# Patient Record
Sex: MALE | Race: BLACK OR AFRICAN AMERICAN | Employment: FULL TIME | URBAN - METROPOLITAN AREA
[De-identification: names, ages, dates, MRNs, and addresses within clinical notes are randomized per-mention and may not be internally consistent; named-entity substitution may affect disease eponyms.]

---

## 2020-11-03 ENCOUNTER — HOSPITAL ENCOUNTER (EMERGENCY)
Age: 45
Discharge: HOME | End: 2020-11-03
Attending: EMERGENCY MEDICINE

## 2020-11-03 VITALS
WEIGHT: 166 LBS | RESPIRATION RATE: 16 BRPM | SYSTOLIC BLOOD PRESSURE: 143 MMHG | TEMPERATURE: 98.8 F | OXYGEN SATURATION: 100 % | DIASTOLIC BLOOD PRESSURE: 92 MMHG | HEIGHT: 70 IN | HEART RATE: 67 BPM | BODY MASS INDEX: 23.77 KG/M2

## 2020-11-03 PROCEDURE — 99282 EMERGENCY DEPT VISIT SF MDM: CPT

## 2020-11-03 RX ORDER — NAPROXEN 500 MG/1
500 TABLET ORAL 2 TIMES DAILY WITH MEALS
Qty: 28 TABLET | Refills: 0 | Status: SHIPPED | OUTPATIENT
Start: 2020-11-03

## 2020-11-03 ASSESSMENT — PAIN SCALES - GENERAL: PAINLEVEL_OUTOF10: 7

## 2020-11-03 ASSESSMENT — PAIN DESCRIPTION - PAIN TYPE: TYPE: ACUTE PAIN

## 2020-11-03 ASSESSMENT — PAIN DESCRIPTION - LOCATION: LOCATION: BACK;WRIST

## 2020-11-03 ASSESSMENT — PAIN DESCRIPTION - DESCRIPTORS: DESCRIPTORS: ACHING

## 2020-11-03 ASSESSMENT — PAIN DESCRIPTION - FREQUENCY: FREQUENCY: CONTINUOUS

## 2020-11-03 ASSESSMENT — PAIN DESCRIPTION - ORIENTATION: ORIENTATION: RIGHT;LEFT

## 2020-11-03 ASSESSMENT — PAIN DESCRIPTION - ONSET: ONSET: AWAKENED FROM SLEEP

## 2020-11-03 NOTE — ED PROVIDER NOTES
drugs.    Medications     Discharge Medication List as of 11/3/2020  8:59 AM          Allergies     He has No Known Allergies. Physical Exam     INITIAL VITALS: BP: (!) 143/92, Temp: 98.8 °F (37.1 °C), Pulse: 67, Resp: 16, SpO2: 100 %     General: Well appearing. Pleasantly conversational, and in NAD. HEENT: Pupils are equal, round, and reactive to light. Extraocular muscles are intact. Conjunctivae are clear and moist. No redness or drainage from the eyes. Mask is in place. Neck: Supple, with full range of motion. Cardiovascular:  2+ radial pulses bilaterally. Respiratory: Unlabored breathing with equal chest rise and fall. Skin: Warm and dry, without rashes or ecchymoses, lacerations or abrasions. Neuro: Alert and oriented x3. No focal neurologic deficits are noted. Extremities: Warm and well-perfused, without clubbing, cyanosis, or edema. The patient moves all extremities equally. On focused examination of the hands and wrists, the patient has very symmetric exam bilaterally. In neither hand or wrist is there any edema or deformity. In both wrists he has no focal bony tenderness to palpation, but has some tenderness along the volar ulnar aspect of the wrist in the region of flexor carpi ulnaris, with some pain on supination and pronation in the wrists, and pain on active and passive extension of the wrists. There is no associated warmth or erythema superficially. Patient endorses some occasional paresthetic sensation primarily along the palmar surface of the fifth digits bilaterally, with normal sensation on the dorsal surface. Psych: The patient's mood and affect are generally within normal limits for their presentation. Diagnostic Results       RADIOLOGY:  No orders to display       LABS:   No results found for this visit on 11/03/20.       RECENT VITALS:  BP: (!) 143/92, Temp: 98.8 °F (37.1 °C), Pulse: 67, Resp: 16, SpO2: 100 %     Procedures       ED Course Nursing Notes, Past Medical Hx, Past Surgical Hx, Social Hx, Allergies, and Family Hx were reviewed. The patient was given the following medications:  Orders Placed This Encounter   Medications    naproxen (NAPROSYN) 500 MG tablet     Sig: Take 1 tablet by mouth 2 times daily (with meals)     Dispense:  28 tablet     Refill:  0       CONSULTS:  None    MEDICAL DECISION MAKING / ASSESSMENT / Stephanie Arnie is a 41 y.o. male who presents with persistent volar ulnar wrist discomfort, particularly with twisting motions of the wrist and with extension at the wrists, symmetric bilaterally, for 2 weeks after an injury in which he feels that his wrists were strained. There is no bony tenderness to palpation or deformity. There is no indication at this time for plain films. He seems to have strain of the soft tissues on the volar ulnar aspect of the wrist.  He describes a symmetric paresthetic sensation in the palmar surface of the fifth digits bilaterally, and there may be some component of ulnar neuropathy at the wrist.  Patient was placed in Velcro wrist splints for comfort, advised on scheduled NSAID use, and referred to hand surgery for further evaluation and management. Clinical Impression     1. Sprain of left wrist, initial encounter    2. Sprain of right wrist, initial encounter        Disposition     PATIENT REFERRED TO:  Jarek Mendoza MD  [facility] [address]  [phone]    Call today  to discuss your ER visit, and arrange a follow-up appointment      DISCHARGE MEDICATIONS:  Discharge Medication List as of 11/3/2020  8:59 AM      START taking these medications    Details   naproxen (NAPROSYN) 500 MG tablet Take 1 tablet by mouth 2 times daily (with meals), Disp-28 tablet,R-0Print             DISPOSITION Decision To Discharge    (Please note that portions of this note were completed with voice recognition software.   Efforts were made to edit the dictations but occasionally words are mis-transcribed.)     Marci Coy MD  11/03/20 7067

## 2020-11-03 NOTE — ED NOTES
Bilateral velcro wrist splints applied. Instructions provided and patient voiced understanding.      Jona Segovia RN  11/03/20 9723

## 2020-11-16 ENCOUNTER — OFFICE VISIT (OUTPATIENT)
Dept: ORTHOPEDIC SURGERY | Age: 45
End: 2020-11-16

## 2020-11-16 VITALS — HEIGHT: 70 IN | BODY MASS INDEX: 23.77 KG/M2 | WEIGHT: 166 LBS

## 2020-11-16 PROCEDURE — 99203 OFFICE O/P NEW LOW 30 MIN: CPT | Performed by: ORTHOPAEDIC SURGERY

## 2020-11-16 PROCEDURE — L3908 WHO COCK-UP NONMOLDE PRE OTS: HCPCS | Performed by: ORTHOPAEDIC SURGERY

## 2020-11-16 NOTE — PROGRESS NOTES
Chief Complaint    Wrist Pain (Bilateral)      History of Present Illness:  Eliza Gutierrez is a 41 y.o. male, right-hand-dominant, works at [employer], presenting with history of bilateral wrist injury  Date of injury: 10/16/2020  This is a work-related injury that occurred when the patient was stuck between 2 falling zelaya causing his wrist to be bent awkwardly  He presented to the emergency department after his injury and was diagnosed with bilateral wrist sprains. Since that time he has continued to have pain in both of his wrists as well as both of his hands intermittently  He has pain with certain hand and wrist movements  The patient does report history of left wrist injury also well working with some heavy folding zelaya approximately 5 and half months ago to his left wrist and he feels that this most recent injury may have exacerbated some of the symptoms  Treatment to date has included wrist braces but he feels that these have not been particularly effective         Medical History:  Patient's medications, allergies, past medical, surgical, social and family histories were reviewed and updated as appropriate. Past Medical History:   Diagnosis Date    Lung injury     occurred with surgery       Review of Systems:  Pertinent items are noted in HPI  Review of systems reviewed from Patient History Form dated on 11/16/2020  and available in the patient's chart under the Media tab. Vital Signs: There were no vitals taken for this visit. General Exam:   Constitutional: Patient is adequately groomed with no evidence of malnutrition  DTRs: Deep tendon reflexes are intact  Mental Status: The patient is oriented to time, place and person. The patient's mood and affect are appropriate. Lymphatic: The lymphatic examination bilaterally reveals all areas to be without enlargement or induration. Vascular: Examination reveals no swelling or calf tenderness.   Peripheral pulses are palpable tendinitis  Follow-up after MRI is completed to discuss results and further treatment options

## 2020-12-08 ENCOUNTER — TELEPHONE (OUTPATIENT)
Dept: ORTHOPEDIC SURGERY | Age: 45
End: 2020-12-08

## 2020-12-08 NOTE — TELEPHONE ENCOUNTER
General Question     Subject: Referral for back  Patient and /or Facility Request: Jenna Alva  Contact Number: [phone]  He would like for Dr. Rosio Hardin to give him a call.

## 2020-12-15 ENCOUNTER — TELEPHONE (OUTPATIENT)
Dept: ORTHOPEDIC SURGERY | Age: 45
End: 2020-12-15

## 2020-12-21 ENCOUNTER — OFFICE VISIT (OUTPATIENT)
Dept: ORTHOPEDIC SURGERY | Age: 45
End: 2020-12-21
Payer: COMMERCIAL

## 2020-12-21 VITALS — BODY MASS INDEX: 23.77 KG/M2 | WEIGHT: 166 LBS | HEIGHT: 70 IN

## 2020-12-21 PROCEDURE — 20605 DRAIN/INJ JOINT/BURSA W/O US: CPT | Performed by: ORTHOPAEDIC SURGERY

## 2020-12-21 PROCEDURE — 99213 OFFICE O/P EST LOW 20 MIN: CPT | Performed by: ORTHOPAEDIC SURGERY

## 2020-12-21 RX ORDER — TRIAMCINOLONE ACETONIDE 40 MG/ML
40 INJECTION, SUSPENSION INTRA-ARTICULAR; INTRAMUSCULAR ONCE
Status: COMPLETED | OUTPATIENT
Start: 2020-12-21 | End: 2020-12-21

## 2020-12-21 RX ADMIN — TRIAMCINOLONE ACETONIDE 40 MG: 40 INJECTION, SUSPENSION INTRA-ARTICULAR; INTRAMUSCULAR at 11:11

## 2020-12-21 NOTE — PROGRESS NOTES
Injection administration details:  Date & Time: 12/21/20 11:12 AM  Site & Comments: LEFT WRIST  administered by Dr Jeff Cough 1%   CC# : 1  NDC: 2811-3820-63  Lot number: [id number]  EXP: 5/21

## 2020-12-21 NOTE — PROGRESS NOTES
Assessment: 67-year-old male presenting with bilateral wrist pain mostly ulnar-sided left greater than right  1. Suspect component of left wrist TFCC pathology possible tear versus sprain, is a triquetral arthritis left wrist  Right wrist possible TFCC pathology versus lunotriquetral or visit triquetral arthritis/ligamentous pathology    Treatment Plan: I had a discussion with the patient today specifically reviewing the results of MRI of his left wrist.  His MRI does demonstrate some inflammation around his TFCC and also some changes at the pisotriquetral joint. On his examination he does have more tenderness at the ulnar fovea and TFCC and still some tenderness along the FCU tendon but not as much specific tenderness of his recreational joint. I did suggest with him several options today including corticosteroid injection ulnocarpal for a one-time injection to try to calm some the inflammation down as well as surgical intervention particularly if persistent symptoms for further investigation including diagnostic/treatment with wrist arthroscopy  For now we will continue to brace his right wrist and monitor symptoms and performed a left wrist injection today  The risks and benefits of steroid injection were discussed thoroughly. Risks discussed included infection, adverse drug reaction, increased pain post-injection, skin de-pigmentation, fatty atrophy, and persistent clinical symptoms despite injection. The injection was given after cleansing the skin with alcohol. The patients left wrist ulnocarpal joint was prepped for steroid injection. Using sterile technique, the left wrist ulnocarpal joint was injected with a mixture of one ml of 40mg/ml Kenalog and 1 mL 1% lidocaine. Appropriate post injection instructions were given. The patient tolerated the injection well and a Band-aid was placed.    Follow-up plans discussed with patient including plan for follow-up in approximately 1 month for repeat greater than right  Mild tenderness along FCU tendon bilateral wrist with no palpable crepitus  Mild tenderness at left triquetrum and lunotriquetral interval with no tenderness throughout the remainder of bilateral wrists and hands     Range of Motion: Comfortable range of motion with full composite fist and full active extension of all fingers  Approximately 70 degrees of wrist flexion and extension with 70 degrees of pronation supination mild discomfort with terminal supination  There is mild discomfort with terminal extension bilateral wrist  Strength: 5 out of 5 EPL FPL thumb abduction  5-5 FDS FDP EDC interossei all fingers     Special Tests: Negative Casillas scaphoid shift test bilaterally  5 out of 5 wrist flexion and extension  Stable distal radial ulnar joint pronation supination neutral position  Negative Tinel's bilateral carpal tunnel and negative direct carpal tunnel compression test     Skin: There are no rashes, ulcerations or lesions.     Gait: Nonantalgic heel-to-toe gait    Capillary refill brisk all fingers, symmetric  Gross sensation intact to light touch median/ulnar/radial nerves  Sensation intact to radial/ulnar aspect of fingertip        Radiology:    X-rays obtained and reviewed in office:  No new x-rays obtained today    Additional Diagnostic Test Findings:    Office Procedures:  No orders of the defined types were placed in this encounter. Leah Caal MD  Orthopaedic Surgeon, Hand & Upper Extremity   [facility]    Contact Information:  Shravanalecia Kathrin: [phone] Clinical )    This dictation was performed with a verbal recognition program [facility]) and it was checked for errors. It is possible that there are still dictated errors within this office note. If so, please bring any errors to my attention for an addendum. All efforts were made to ensure that this office note is accurate.

## 2020-12-22 ENCOUNTER — TELEPHONE (OUTPATIENT)
Dept: ORTHOPEDIC SURGERY | Age: 45
End: 2020-12-22

## 2020-12-22 NOTE — TELEPHONE ENCOUNTER
General Question     Subject: PROCEDURE CODE   Patient and /or Facility Request: Angela Jeffrey  Contact Number: [phone]

## 2022-11-16 ENCOUNTER — OFFICE VISIT (OUTPATIENT)
Dept: FAMILY MEDICINE CLINIC | Age: 47
End: 2022-11-16
Payer: COMMERCIAL

## 2022-11-16 VITALS
OXYGEN SATURATION: 98 % | HEART RATE: 88 BPM | WEIGHT: 203.6 LBS | SYSTOLIC BLOOD PRESSURE: 138 MMHG | RESPIRATION RATE: 16 BRPM | TEMPERATURE: 97.5 F | BODY MASS INDEX: 28.5 KG/M2 | HEIGHT: 71 IN | DIASTOLIC BLOOD PRESSURE: 90 MMHG

## 2022-11-16 DIAGNOSIS — R03.0 ELEVATED BLOOD PRESSURE READING IN OFFICE WITHOUT DIAGNOSIS OF HYPERTENSION: ICD-10-CM

## 2022-11-16 DIAGNOSIS — R45.89 DEPRESSED MOOD: ICD-10-CM

## 2022-11-16 DIAGNOSIS — R00.2 HEART PALPITATIONS: Primary | ICD-10-CM

## 2022-11-16 PROCEDURE — 99204 OFFICE O/P NEW MOD 45 MIN: CPT | Performed by: FAMILY MEDICINE

## 2022-11-16 RX ORDER — BUPROPION HYDROCHLORIDE 150 MG/1
150 TABLET ORAL EVERY MORNING
Qty: 30 TABLET | Refills: 3 | Status: SHIPPED | OUTPATIENT
Start: 2022-11-16

## 2022-11-16 RX ORDER — BLOOD PRESSURE TEST KIT
1 KIT MISCELLANEOUS 2 TIMES DAILY
Qty: 1 KIT | Refills: 0 | Status: SHIPPED | OUTPATIENT
Start: 2022-11-16

## 2022-11-16 SDOH — ECONOMIC STABILITY: FOOD INSECURITY: WITHIN THE PAST 12 MONTHS, THE FOOD YOU BOUGHT JUST DIDN'T LAST AND YOU DIDN'T HAVE MONEY TO GET MORE.: OFTEN TRUE

## 2022-11-16 SDOH — ECONOMIC STABILITY: FOOD INSECURITY: WITHIN THE PAST 12 MONTHS, YOU WORRIED THAT YOUR FOOD WOULD RUN OUT BEFORE YOU GOT MONEY TO BUY MORE.: OFTEN TRUE

## 2022-11-16 ASSESSMENT — PATIENT HEALTH QUESTIONNAIRE - PHQ9
3. TROUBLE FALLING OR STAYING ASLEEP: 3
9. THOUGHTS THAT YOU WOULD BE BETTER OFF DEAD, OR OF HURTING YOURSELF: 0
SUM OF ALL RESPONSES TO PHQ QUESTIONS 1-9: 13
SUM OF ALL RESPONSES TO PHQ9 QUESTIONS 1 & 2: 6
7. TROUBLE CONCENTRATING ON THINGS, SUCH AS READING THE NEWSPAPER OR WATCHING TELEVISION: 1
5. POOR APPETITE OR OVEREATING: 0
2. FEELING DOWN, DEPRESSED OR HOPELESS: 3
SUM OF ALL RESPONSES TO PHQ QUESTIONS 1-9: 13
4. FEELING TIRED OR HAVING LITTLE ENERGY: 0
SUM OF ALL RESPONSES TO PHQ QUESTIONS 1-9: 13
SUM OF ALL RESPONSES TO PHQ QUESTIONS 1-9: 13
10. IF YOU CHECKED OFF ANY PROBLEMS, HOW DIFFICULT HAVE THESE PROBLEMS MADE IT FOR YOU TO DO YOUR WORK, TAKE CARE OF THINGS AT HOME, OR GET ALONG WITH OTHER PEOPLE: 1
6. FEELING BAD ABOUT YOURSELF - OR THAT YOU ARE A FAILURE OR HAVE LET YOURSELF OR YOUR FAMILY DOWN: 3
1. LITTLE INTEREST OR PLEASURE IN DOING THINGS: 3
8. MOVING OR SPEAKING SO SLOWLY THAT OTHER PEOPLE COULD HAVE NOTICED. OR THE OPPOSITE, BEING SO FIGETY OR RESTLESS THAT YOU HAVE BEEN MOVING AROUND A LOT MORE THAN USUAL: 0

## 2022-11-16 ASSESSMENT — SOCIAL DETERMINANTS OF HEALTH (SDOH): HOW HARD IS IT FOR YOU TO PAY FOR THE VERY BASICS LIKE FOOD, HOUSING, MEDICAL CARE, AND HEATING?: VERY HARD
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
## 2022-11-17 NOTE — PROGRESS NOTES
[address]   Cardiac Consultation    Referring Provider:  Kamran West DO     Chief Complaint   Patient presents with    New Patient        History of Present Illness:   Lu Whatley. Ute Leija, 56 y.o. male presenting today as a new patient consult from Kamran West DO for further cardiac evaluation of atrial fibrillation. He feels heart fluttering about 1-3 times a year. Nov of last year is that last time he felt it due to being on a medication for back pain. He states that it went on for a couple of days. Stopped the med and it resolved. No assoc chest pain at that time. Symptoms were moderate severity. He states that he has not had any chest pain sine 2018 associated w/ his AF. 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 No functional limitations. He stopped smoking in Aug 2020. He denies active chest pain, sob, dizziness, syncope and edema. Past Medical History:   has a past medical history of Atrial fibrillation ([address] Chronic back pain, Depression, Hyperthyroidism, Lung injury, and Osteoarthritis. Surgical History:   has a past surgical history that includes Exploration of undescended testicle. Social History:   reports that he quit smoking about 2 years ago. His smoking use included cigarettes. He has never used smokeless tobacco. He reports current alcohol use. He reports that he does not use drugs. Family History:  family history includes Cancer in his paternal aunt. Home Medications:  Prior to Admission medications    Medication Sig Start Date End Date Taking? Authorizing Provider   Blood Pressure KIT 1 kit by Does not apply route 2 times daily 11/16/22  Yes Kamran West DO   buPROPion (WELLBUTRIN XL) 150 MG extended release tablet Take 1 tablet by mouth every morning 11/16/22  Yes Kamran West DO        Allergies:  Patient has no known allergies.      Review of Systems:   Constitutional: there has been no unanticipated weight loss. There's been no change in energy level, sleep pattern, or activity level. Eyes: No visual changes or diplopia. No scleral icterus. ENT: No Headaches, hearing loss or vertigo. No mouth sores or sore throat. Cardiovascular: Reviewed in HPI  Respiratory: No cough or wheezing, no sputum production. No hematemesis. Gastrointestinal: No abdominal pain, appetite loss, blood in stools. No change in bowel or bladder habits. Genitourinary: No dysuria, trouble voiding, or hematuria. Musculoskeletal:  No gait disturbance, weakness or joint complaints. Integumentary: No rash or pruritis. Neurological: No headache, diplopia, change in muscle strength, numbness or tingling. No change in gait, balance, coordination, mood, affect, memory, mentation, behavior. Psychiatric: No anxiety, no depression. Endocrine: No malaise, fatigue or temperature intolerance. No excessive thirst, fluid intake, or urination. No tremor. Hematologic/Lymphatic: No abnormal bruising or bleeding, blood clots or swollen lymph nodes. Allergic/Immunologic: No nasal congestion or hives. Physical Examination:    Vitals:    11/18/22 1222   BP: 130/72   Pulse: 73   SpO2: 99%        Constitutional and General Appearance: NAD   Respiratory:  Normal excursion and expansion without use of accessory muscles  Resp Auscultation: Normal breath sounds without dullness  Cardiovascular: The apical impulses not displaced  Heart tones are crisp and normal  Cervical veins are not engorged  The carotid upstroke is normal in amplitude and contour without delay or bruit  Normal S1S2, No S3, No Murmur  Peripheral pulses are symmetrical and full  There is no clubbing, cyanosis of the extremities.   No edema  Femoral Arteries: 2+ and equal  Pedal Pulses: 2+ and equal   Abdomen:  No masses or tenderness  Liver/Spleen: No Abnormalities Noted  Neurological/Psychiatric:  Alert and oriented in all spheres  Moves all extremities well  Exhibits normal gait balance and coordination  No abnormalities of mood, affect, memory, mentation, or behavior are noted      EK17 [facility]  Abnormal, Atrial Fibrillation    [facility]  Sinus [facility], HR 88    ECHO: 17 [facility]  Summary:   The left ventricular wall motion is normal.   Overall left ventricular ejection fraction is estimated to be 60-65%. There is trace mitral regurgitation. There is borderline concentric left ventricular hypertrophy. Assessment:   PAF - only known documented episode  - spontaneous resolution. Palpitations - rare, self-limited     Plan:  Cardiac event monitor for 30 days. If no AF, would not add any meds. Instructed patient that if has symptoms in future, should call or go to ER. Possible cardiac medications reviewed including indications and pertinent side effects. Will defer at this time. Check blood pressure and heart rate at home a few times per week- keep a log with dates and times and bring to office visit   Regular exercise and following a healthy diet encouraged   Follow up pending monitor results or if recurrent symptoms    The scribes documentation has been prepared under my direction and personally reviewed by me in its entirety. I confirm that the note above accurately reflects all work, treatment, procedures, and medical decision making performed by me. Dr. Chencho Orozco MD     Scribe's attestation: This note was scribed in the presence of Dr. Chencho Orozco MD   by Gerardo Mulligan LPN      Thank you for allowing me to participate in the care of this individual.      Stefan Jenkins.  Phyllis Dumont M.D., Shavonne Montiel

## 2022-11-18 ENCOUNTER — OFFICE VISIT (OUTPATIENT)
Dept: CARDIOLOGY CLINIC | Age: 47
End: 2022-11-18
Payer: COMMERCIAL

## 2022-11-18 VITALS
HEART RATE: 73 BPM | SYSTOLIC BLOOD PRESSURE: 130 MMHG | DIASTOLIC BLOOD PRESSURE: 72 MMHG | OXYGEN SATURATION: 99 % | WEIGHT: 203.4 LBS | HEIGHT: 71 IN | BODY MASS INDEX: 28.48 KG/M2

## 2022-11-18 DIAGNOSIS — I49.9 IRREGULAR HEART RATE: Primary | ICD-10-CM

## 2022-11-18 PROCEDURE — 99214 OFFICE O/P EST MOD 30 MIN: CPT | Performed by: INTERNAL MEDICINE

## 2022-11-18 PROCEDURE — 93000 ELECTROCARDIOGRAM COMPLETE: CPT | Performed by: INTERNAL MEDICINE

## 2022-11-18 NOTE — LETTER
The [address] [place] [address], [address] [place] [address]  P:[phone]  F: [phone]      November 18, 2022      G[address] Mare García, 1975 was seen in our office today for an appointment. If you have any questions, please do not hesitate to call our office.     Thank you,      Dr. Xavier Johnson MD

## 2022-11-22 ENCOUNTER — TELEPHONE (OUTPATIENT)
Dept: CARDIOLOGY CLINIC | Age: 47
End: 2022-11-22

## 2022-11-22 NOTE — TELEPHONE ENCOUNTER
Monitor placed by [facility]  Monitor company [employer]  Length of monitor 30 DAY  Monitor ordered by [facility]  Serial number [id number]  Patch ID [id number]  Activation successful prior to pt leaving office?  Yes

## 2022-12-13 ENCOUNTER — OFFICE VISIT (OUTPATIENT)
Dept: FAMILY MEDICINE CLINIC | Age: 47
End: 2022-12-13
Payer: COMMERCIAL

## 2022-12-13 VITALS
WEIGHT: 200.2 LBS | TEMPERATURE: 97.7 F | HEART RATE: 88 BPM | RESPIRATION RATE: 16 BRPM | BODY MASS INDEX: 27.92 KG/M2 | SYSTOLIC BLOOD PRESSURE: 147 MMHG | OXYGEN SATURATION: 98 % | DIASTOLIC BLOOD PRESSURE: 97 MMHG

## 2022-12-13 DIAGNOSIS — H53.9 VISION DISTURBANCE: ICD-10-CM

## 2022-12-13 DIAGNOSIS — I10 PRIMARY HYPERTENSION: ICD-10-CM

## 2022-12-13 DIAGNOSIS — R45.89 DEPRESSED MOOD: Primary | ICD-10-CM

## 2022-12-13 PROCEDURE — 3075F SYST BP GE 130 - 139MM HG: CPT | Performed by: FAMILY MEDICINE

## 2022-12-13 PROCEDURE — 3078F DIAST BP <80 MM HG: CPT | Performed by: FAMILY MEDICINE

## 2022-12-13 PROCEDURE — 99214 OFFICE O/P EST MOD 30 MIN: CPT | Performed by: FAMILY MEDICINE

## 2022-12-13 RX ORDER — AMLODIPINE BESYLATE 10 MG/1
10 TABLET ORAL DAILY
Qty: 30 TABLET | Refills: 5 | Status: SHIPPED | OUTPATIENT
Start: 2022-12-13

## 2022-12-13 RX ORDER — BUPROPION HYDROCHLORIDE 300 MG/1
300 TABLET ORAL EVERY MORNING
Qty: 30 TABLET | Refills: 3 | Status: SHIPPED | OUTPATIENT
Start: 2022-12-13

## 2022-12-13 ASSESSMENT — PATIENT HEALTH QUESTIONNAIRE - PHQ9
2. FEELING DOWN, DEPRESSED OR HOPELESS: 0
SUM OF ALL RESPONSES TO PHQ QUESTIONS 1-9: 0
SUM OF ALL RESPONSES TO PHQ QUESTIONS 1-9: 0
1. LITTLE INTEREST OR PLEASURE IN DOING THINGS: 0
SUM OF ALL RESPONSES TO PHQ QUESTIONS 1-9: 0
SUM OF ALL RESPONSES TO PHQ QUESTIONS 1-9: 0
SUM OF ALL RESPONSES TO PHQ9 QUESTIONS 1 & 2: 0

## 2022-12-13 NOTE — PROGRESS NOTES
2022    This is a 56 y.o. male   Chief Complaint   Patient presents with    Follow-up     Depression    . HPI  Pt presents today for  a depression and BP follow-up. Depressed mood: Currently taking Wellbutrin  mg, states he is not as down as before. Elevated blood pressure: BP elevated today at 149/97  Home readings ranging upper upper 140's-200/90's -110's  Admits to HA's, doesn't add salt, doesn't eat often in restaurants    Also admits to spots in his eyes, right eye is blurry and sees flashing.     Past Medical History:   Diagnosis Date    Atrial fibrillation (HCC)     Chronic back pain     Depression     Hyperthyroidism     Lung injury     occurred with surgery    Osteoarthritis        Past Surgical History:   Procedure Laterality Date    EXPLORATION OF UNDESCENDED TESTICLE         Social History     Socioeconomic History    Marital status: Single     Spouse name: Not on file    Number of children: Not on file    Years of education: Not on file    Highest education level: Not on file   Occupational History    Not on file   Tobacco Use    Smoking status: Former     Types: Cigarettes     Quit date: 2020     Years since quittin.3    Smokeless tobacco: Never   Vaping Use    Vaping Use: Never used   Substance and Sexual Activity    Alcohol use: Yes     Comment: 3 days a week    Drug use: Never    Sexual activity: Yes     Partners: Female   Other Topics Concern    Not on file   Social History Narrative    Not on file     Social Determinants of Health     Financial Resource Strain: High Risk    Difficulty of Paying Living Expenses: Very hard   Food Insecurity: Food Insecurity Present    Worried About Running Out of Food in the Last Year: Often true    Ran Out of Food in the Last Year: Often true   Transportation Needs: Not on file   Physical Activity: Not on file   Stress: Not on file   Social Connections: Not on file   Intimate Partner Violence: Not on file   Housing Stability: Not on file       Family History   Problem Relation Age of Onset    Cancer Paternal Aunt        Current Outpatient Medications   Medication Sig Dispense Refill    amLODIPine (NORVASC) 10 MG tablet Take 1 tablet by mouth daily . Take 1/2 tablet daily for the first week. 30 tablet 5    buPROPion (WELLBUTRIN XL) 300 MG extended release tablet Take 1 tablet by mouth every morning 30 tablet 3    Blood Pressure KIT 1 kit by Does not apply route 2 times daily 1 kit 0     No current facility-administered medications for this visit. Immunization History   Administered Date(s) Administered    COVID-19, PFIZER PURPLE top, DILUTE for use, (age 15 y+), 30mcg/0.3mL 04/07/2021, 04/30/2021       No Known Allergies    No results found for any previous visit. Review of Systems   Eyes:  Positive for visual disturbance. Neurological:  Positive for headaches. BP (!) 147/97 (Site: Right Upper Arm, Position: Sitting, Cuff Size: Large Adult)   Pulse 88   Temp 97.7 °F (36.5 °C) (Temporal)   Resp 16   Wt 200 lb 3.2 oz (90.8 kg)   SpO2 98%   BMI 27.92 kg/m²     Physical Exam  Vitals reviewed. Plan   Diagnosis Orders   1. Depressed mood  Improved but still present, increase Wellbutrin XL to 300 mg daily      2. Primary hypertension  amLODIPine (NORVASC) 10 MG tablet (5 mg for the first week)      3. Vision disturbance  Kyler Amador MD, Ophthalmology, [facility]          BP BID x 1 week and call the office with the readings. Return in about 1 month (around 1/13/2023) for Physical Exam.    Prior to Visit Medications    Medication Sig Taking? Authorizing Provider   amLODIPine (NORVASC) 10 MG tablet Take 1 tablet by mouth daily . Take 1/2 tablet daily for the first week.  Yes Nai Curtis DO   buPROPion (WELLBUTRIN XL) 300 MG extended release tablet Take 1 tablet by mouth every morning Yes Nai Curtis DO   Blood Pressure KIT 1 kit by Does not apply route 2 times daily  Nai Curtis DO

## 2022-12-14 ENCOUNTER — TELEPHONE (OUTPATIENT)
Dept: FAMILY MEDICINE CLINIC | Age: 47
End: 2022-12-14

## 2022-12-14 NOTE — TELEPHONE ENCOUNTER
Patient called to report BP readings as instructed by provider   Today 12/14/22 -172/94 @4:45am                              157/92@ 2:13pm

## 2022-12-17 NOTE — TELEPHONE ENCOUNTER
Please thank pt for checking his BP and letting us know the readings. I'd for him to write down his AM and PM readings and call the office on 12/21 with the readings. This will give the Amlodipine a few more days to reach a therapeutic level. Thank you.

## 2022-12-22 ENCOUNTER — TELEPHONE (OUTPATIENT)
Dept: FAMILY MEDICINE CLINIC | Age: 47
End: 2022-12-22

## 2022-12-22 DIAGNOSIS — M54.50 LOW BACK PAIN, UNSPECIFIED BACK PAIN LATERALITY, UNSPECIFIED CHRONICITY, UNSPECIFIED WHETHER SCIATICA PRESENT: Primary | ICD-10-CM

## 2022-12-22 NOTE — TELEPHONE ENCOUNTER
Patient is asking for a referral/order to get an epidural shot for his back. He states that Dr. Anuradha Perez (Alonso Mcmahan?) with [facility] told him to speak to his PCP regarding this.      12/13/2022 last ov    Future Appointments   Date Time Provider Enoc Alvarez   2/22/2023  3:30 PM Geovanna Raphael DO [place] [id number]

## 2022-12-29 PROCEDURE — 93228 REMOTE 30 DAY ECG REV/REPORT: CPT | Performed by: INTERNAL MEDICINE

## 2023-01-08 NOTE — TELEPHONE ENCOUNTER
I cannot find a Dr. Hernandez Body. Can you please recheck the name/spelling so that I can place the referral? Thank you.

## 2023-01-10 NOTE — TELEPHONE ENCOUNTER
Patient called back and didn't know the spelling of that Dr. Nika Lindsey (he didn't even know if that was his name), but said that Dr he mentioned was in the same practice as Dr. Rae Johnson. He said they received the referral that we already sent and he has an appt on the 01/16/23.

## 2023-01-17 ENCOUNTER — OFFICE VISIT (OUTPATIENT)
Dept: FAMILY MEDICINE CLINIC | Age: 48
End: 2023-01-17
Payer: COMMERCIAL

## 2023-01-17 VITALS
HEIGHT: 71 IN | OXYGEN SATURATION: 97 % | HEART RATE: 102 BPM | TEMPERATURE: 98.8 F | DIASTOLIC BLOOD PRESSURE: 97 MMHG | WEIGHT: 202 LBS | SYSTOLIC BLOOD PRESSURE: 144 MMHG | BODY MASS INDEX: 28.28 KG/M2

## 2023-01-17 DIAGNOSIS — I10 PRIMARY HYPERTENSION: ICD-10-CM

## 2023-01-17 DIAGNOSIS — H93.13 TINNITUS, BILATERAL: ICD-10-CM

## 2023-01-17 DIAGNOSIS — J01.00 ACUTE MAXILLARY SINUSITIS, RECURRENCE NOT SPECIFIED: Primary | ICD-10-CM

## 2023-01-17 DIAGNOSIS — R05.9 COUGH, UNSPECIFIED TYPE: ICD-10-CM

## 2023-01-17 LAB
INFLUENZA A ANTIBODY: NEGATIVE
INFLUENZA B ANTIBODY: NEGATIVE

## 2023-01-17 PROCEDURE — 87804 INFLUENZA ASSAY W/OPTIC: CPT | Performed by: STUDENT IN AN ORGANIZED HEALTH CARE EDUCATION/TRAINING PROGRAM

## 2023-01-17 PROCEDURE — 3078F DIAST BP <80 MM HG: CPT | Performed by: STUDENT IN AN ORGANIZED HEALTH CARE EDUCATION/TRAINING PROGRAM

## 2023-01-17 PROCEDURE — 3074F SYST BP LT 130 MM HG: CPT | Performed by: STUDENT IN AN ORGANIZED HEALTH CARE EDUCATION/TRAINING PROGRAM

## 2023-01-17 PROCEDURE — 99214 OFFICE O/P EST MOD 30 MIN: CPT | Performed by: STUDENT IN AN ORGANIZED HEALTH CARE EDUCATION/TRAINING PROGRAM

## 2023-01-17 RX ORDER — AZITHROMYCIN 250 MG/1
250 TABLET, FILM COATED ORAL SEE ADMIN INSTRUCTIONS
Qty: 6 TABLET | Refills: 0 | Status: SHIPPED | OUTPATIENT
Start: 2023-01-17 | End: 2023-01-22

## 2023-01-17 RX ORDER — FLUTICASONE PROPIONATE 50 MCG
1 SPRAY, SUSPENSION (ML) NASAL DAILY
Qty: 16 G | Refills: 0 | Status: SHIPPED | OUTPATIENT
Start: 2023-01-17

## 2023-01-17 RX ORDER — DEXTROMETHORPHAN HYDROBROMIDE AND PROMETHAZINE HYDROCHLORIDE 15; 6.25 MG/5ML; MG/5ML
5 SYRUP ORAL 4 TIMES DAILY PRN
Qty: 118 ML | Refills: 0 | Status: SHIPPED | OUTPATIENT
Start: 2023-01-17

## 2023-01-17 RX ORDER — HYDROCHLOROTHIAZIDE 12.5 MG/1
12.5 CAPSULE, GELATIN COATED ORAL EVERY MORNING
Qty: 30 CAPSULE | Refills: 0 | Status: SHIPPED | OUTPATIENT
Start: 2023-01-17

## 2023-01-17 ASSESSMENT — PATIENT HEALTH QUESTIONNAIRE - PHQ9
SUM OF ALL RESPONSES TO PHQ QUESTIONS 1-9: 0
SUM OF ALL RESPONSES TO PHQ9 QUESTIONS 1 & 2: 0
2. FEELING DOWN, DEPRESSED OR HOPELESS: 0
1. LITTLE INTEREST OR PLEASURE IN DOING THINGS: 0

## 2023-01-17 ASSESSMENT — ENCOUNTER SYMPTOMS
WHEEZING: 1
RHINORRHEA: 1
COUGH: 1
SORE THROAT: 0
SHORTNESS OF BREATH: 0

## 2023-01-17 NOTE — LETTER
[facility] [address] [place] [id number]  Phone: [phone]  Fax: [phone]    Lina Valencia MD        January 17, 2023     Patient: Trish Rubio   YOB: 1975   Date of Visit: 1/17/2023       To Whom It May Concern: It is my medical opinion that Hannah Solo should remain out of work until 01/19/23, Thursday. If you have any questions or concerns, please don't hesitate to call.     Sincerely,        Lina Valencia MD

## 2023-01-17 NOTE — PATIENT INSTRUCTIONS
Start Hydrochlorothiazide 12.5 mg for HTN, stop if having any side effects   Continue to monitor blood pressures       Advised pt to use aggressive over the counter management before we consider antibiotics. -Use Flonase once daily along with nasal sinus rinses 2 to 4 times/day. - azithromycin 250 mg tablet 500mg PO (2 pills) x 1 day , then 250mg (1 pill daily) x 4 days   - Increase daily water intake, use bedside humidifier.  - Call if no improvement of symptoms in 1 week or  fever, chills, dyspnea, chest pain, any other new or worsening symptoms. Continue using OTC mgmt until symptoms have been gone for 2-3 days.

## 2023-01-17 NOTE — PROGRESS NOTES
[address], [address] [place] [address]  Tel: [phone]      2023     Yolanda Cassidy (:  1975) is a 56 y.o. male, here for evaluation of the following medical concerns:  Chief Complaint   Patient presents with    Cough    Headache    Sinus Problem     Started yesterday        HPI  Patient is a 21-year-old male with history of in hypertension, A. Fib who presents with URI symptoms. Symptoms started yesterday with sneezing then cough, itching throat. The cough is Productive of sputum (clear). Associated symptoms include chest pain, chills, headaches, nasal congestion, rhinorrhea and wheezing. Chronic tinnitus. Pertinent negatives include no ear congestion, ear pain, fever, heartburn, hemoptysis, myalgias, postnasal drip, rash, sore throat (dry, itching), shortness of breath, sweats or weight loss. The symptoms are aggravated by cold air. 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 ). He has tried OTC cough suppressant for the symptoms, Vicks cough drops and Robitussin. There is no history of asthma or COPD. No sick contacts or recent travel. Hypertension   Not taking amlodipine due to chest pain. Stopped 1 months ago. Stopped bupropion due to side effect of impotence. Quit smoking 2020    Blood pressures have been uncontrolled, reports home blood pressures range from 140s to the 412A systolic. Endorses some blurry vision with reading, seen by [facility]. Review of Systems   Constitutional:  Positive for chills. Negative for fever. HENT:  Positive for rhinorrhea. Negative for ear pain, postnasal drip and sore throat (dry, itching). Respiratory:  Positive for cough and wheezing. Negative for shortness of breath. Cardiovascular:  Positive for chest pain. Musculoskeletal:  Negative for myalgias. Skin:  Negative for rash.    Neurological:  Positive for headaches. Prior to Visit Medications    Medication Sig Taking? Authorizing Provider   azithromycin (ZITHROMAX) 250 MG tablet Take 1 tablet by mouth See Admin Instructions for 5 days 500mg on day 1 followed by 250mg on days 2 - 5 Yes Fernando Cardenas MD   fluticasone (FLONASE) 50 MCG/ACT nasal spray 1 spray by Each Nostril route daily Yes Fernando Cardenas MD   promethazine-dextromethorphan (PROMETHAZINE-DM) 6.25-15 MG/5ML syrup Take 5 mLs by mouth 4 times daily as needed for Cough Yes Fernando Cardenas MD   hydroCHLOROthiazide (MICROZIDE) 12.5 MG capsule Take 1 capsule by mouth every morning Yes Fernando Cardenas MD   Blood Pressure KIT 1 kit by Does not apply route 2 times daily Yes Franny Records, DO        Social History     Tobacco Use    Smoking status: Former     Packs/day: 0.25     Years: 5.00     Pack years: 1.25     Types: Cigarettes     Quit date: 2020     Years since quittin.4    Smokeless tobacco: Never   Substance Use Topics    Alcohol use: Yes     Comment: 3 days a week      Physical exam  BP (!) 144/97 (Site: Left Upper Arm, Position: Sitting)   Pulse (!) 102   Temp 98.8 °F (37.1 °C)   Ht 5' 11\" (1.803 m)   Wt 202 lb (91.6 kg)   SpO2 97%   BMI 28.17 kg/m²     Physical Exam  Vitals reviewed. Constitutional:       General: He is not in acute distress. Appearance: Normal appearance. He is not toxic-appearing. HENT:      Right Ear: Tympanic membrane and external ear normal.      Left Ear: Tympanic membrane and external ear normal.      Nose: Mucosal edema and congestion present. No rhinorrhea. Right Sinus: Maxillary sinus tenderness present. No frontal sinus tenderness. Left Sinus: No maxillary sinus tenderness or frontal sinus tenderness. Mouth/Throat:      Mouth: Mucous membranes are moist.      Pharynx: No oropharyngeal exudate or posterior oropharyngeal erythema.    Eyes:      Conjunctiva/sclera: Conjunctivae normal.   Cardiovascular:      Rate and Rhythm: Normal rate and regular rhythm. Pulses: Normal pulses. Heart sounds: Normal heart sounds. No murmur heard. Pulmonary:      Effort: Pulmonary effort is normal. No respiratory distress. Breath sounds: Normal breath sounds. No wheezing. Musculoskeletal:      Cervical back: No tenderness. Lymphadenopathy:      Cervical: No cervical adenopathy. Skin:     General: Skin is warm. Findings: No rash. Neurological:      General: No focal deficit present. Mental Status: He is alert. Psychiatric:         Mood and Affect: Mood normal.       ASSESSMENT/PLAN:    Acute maxillary sinusitis, recurrence not specified  Advised pt to use aggressive OTC management before we consider antibiotics. Use Flonase nasal spray once daily along with nasal sinus rinses 2 to 4 times/day. Promethazine cough syrup  If symptoms worsen over the next week, or present  for more than 10 days may start azithromycin for 5 days. .   Call or return if new or worsening symptoms, fever, chest pain, or worsening cough. Provided with handout of detailed instructions. -     azithromycin (ZITHROMAX) 250 MG tablet; Take 1 tablet by mouth See Admin Instructions for 5 days 500mg on day 1 followed by 250mg on days 2 - 5, Disp-6 tablet, R-0Normal  -     fluticasone (FLONASE) 50 MCG/ACT nasal spray; 1 spray by Each Nostril route daily, Disp-16 g, R-0Normal  -     promethazine-dextromethorphan (PROMETHAZINE-DM) 6.25-15 MG/5ML syrup; Take 5 mLs by mouth 4 times daily as needed for Cough, Disp-118 mL, R-0Normal  Cough, unspecified type  -     POCT Influenza A/B negative  Tinnitus, bilateral  Chronic tinnitus, referral to audiology. -     Johnathon Wells, Audiology, [facility]    Primary hypertension  Reports chest pain with amlodipine. Will start on thiazide. Discussed side effects. Advised patient to follow blood pressures at home. Follow-up within a month. -     hydroCHLOROthiazide (MICROZIDE) 12.5 MG capsule;  Take 1 capsule by mouth every morning, Disp-30 capsule, R-0Normal   Return in about 4 weeks (around 2/14/2023) for htn. An electronic signature was used to authenticate this note.     --Teresita Marie MD on 1/17/2023

## 2023-01-23 DIAGNOSIS — I49.9 IRREGULAR HEART RATE: ICD-10-CM

## 2023-02-22 ENCOUNTER — OFFICE VISIT (OUTPATIENT)
Dept: FAMILY MEDICINE CLINIC | Age: 48
End: 2023-02-22
Payer: COMMERCIAL

## 2023-02-22 VITALS
BODY MASS INDEX: 28.23 KG/M2 | SYSTOLIC BLOOD PRESSURE: 131 MMHG | TEMPERATURE: 97.1 F | RESPIRATION RATE: 16 BRPM | OXYGEN SATURATION: 98 % | HEART RATE: 73 BPM | DIASTOLIC BLOOD PRESSURE: 88 MMHG | HEIGHT: 70 IN | WEIGHT: 197.2 LBS

## 2023-02-22 DIAGNOSIS — Z00.00 ANNUAL PHYSICAL EXAM: Primary | ICD-10-CM

## 2023-02-22 DIAGNOSIS — M54.6 CHRONIC MIDLINE THORACIC BACK PAIN: ICD-10-CM

## 2023-02-22 DIAGNOSIS — R22.9 SUBCUTANEOUS MASS: ICD-10-CM

## 2023-02-22 DIAGNOSIS — I10 PRIMARY HYPERTENSION: ICD-10-CM

## 2023-02-22 DIAGNOSIS — G89.29 CHRONIC BILATERAL LOW BACK PAIN WITH BILATERAL SCIATICA: ICD-10-CM

## 2023-02-22 DIAGNOSIS — G89.29 CHRONIC MIDLINE THORACIC BACK PAIN: ICD-10-CM

## 2023-02-22 DIAGNOSIS — H53.9 VISION DISTURBANCE: ICD-10-CM

## 2023-02-22 DIAGNOSIS — Z12.11 COLON CANCER SCREENING: ICD-10-CM

## 2023-02-22 DIAGNOSIS — M54.42 CHRONIC BILATERAL LOW BACK PAIN WITH BILATERAL SCIATICA: ICD-10-CM

## 2023-02-22 DIAGNOSIS — M54.41 CHRONIC BILATERAL LOW BACK PAIN WITH BILATERAL SCIATICA: ICD-10-CM

## 2023-02-22 PROCEDURE — 3075F SYST BP GE 130 - 139MM HG: CPT | Performed by: FAMILY MEDICINE

## 2023-02-22 PROCEDURE — 3079F DIAST BP 80-89 MM HG: CPT | Performed by: FAMILY MEDICINE

## 2023-02-22 PROCEDURE — 99396 PREV VISIT EST AGE 40-64: CPT | Performed by: FAMILY MEDICINE

## 2023-02-22 RX ORDER — OLMESARTAN MEDOXOMIL AND HYDROCHLOROTHIAZIDE 20/12.5 20; 12.5 MG/1; MG/1
1 TABLET ORAL DAILY
Qty: 90 TABLET | Refills: 1 | Status: CANCELLED | OUTPATIENT
Start: 2023-02-22

## 2023-02-22 SDOH — ECONOMIC STABILITY: HOUSING INSECURITY
IN THE LAST 12 MONTHS, WAS THERE A TIME WHEN YOU DID NOT HAVE A STEADY PLACE TO SLEEP OR SLEPT IN A SHELTER (INCLUDING NOW)?: NO

## 2023-02-22 SDOH — ECONOMIC STABILITY: FOOD INSECURITY: WITHIN THE PAST 12 MONTHS, THE FOOD YOU BOUGHT JUST DIDN'T LAST AND YOU DIDN'T HAVE MONEY TO GET MORE.: NEVER TRUE

## 2023-02-22 SDOH — ECONOMIC STABILITY: FOOD INSECURITY: WITHIN THE PAST 12 MONTHS, YOU WORRIED THAT YOUR FOOD WOULD RUN OUT BEFORE YOU GOT MONEY TO BUY MORE.: OFTEN TRUE

## 2023-02-22 SDOH — ECONOMIC STABILITY: INCOME INSECURITY: HOW HARD IS IT FOR YOU TO PAY FOR THE VERY BASICS LIKE FOOD, HOUSING, MEDICAL CARE, AND HEATING?: VERY HARD

## 2023-02-22 ASSESSMENT — PATIENT HEALTH QUESTIONNAIRE - PHQ9
SUM OF ALL RESPONSES TO PHQ9 QUESTIONS 1 & 2: 0
SUM OF ALL RESPONSES TO PHQ QUESTIONS 1-9: 0
1. LITTLE INTEREST OR PLEASURE IN DOING THINGS: 0
SUM OF ALL RESPONSES TO PHQ QUESTIONS 1-9: 0
2. FEELING DOWN, DEPRESSED OR HOPELESS: 0

## 2023-02-22 ASSESSMENT — ENCOUNTER SYMPTOMS
COLOR CHANGE: 0
ABDOMINAL PAIN: 0
BACK PAIN: 1
SHORTNESS OF BREATH: 0

## 2023-02-22 NOTE — PROGRESS NOTES
Tre Ch  YOB: 1975    Date of Service:  2/22/2023    Chief Complaint:   Tre Ch is a 56 y.o. male who presents for complete physical examination. HPI:  HPI  Patient's list of home blood pressure readings show fluctuations in both morning and evening readings but are consistently elevated. Currently taking hydrochlorothiazide 12.5 mg every morning. Was taking amlodipine 10 mg but reported chest pain while taking. Labs ordered on November 16, 2022 have not been done. Self-testicular exams: Yes (only has one testicle from riding a bike at age 12 y)  Sexual activity: Yes   Last eye exam: Last year,normal  Exercise: walks 6-8 miles a day at work  Seatbelt use: yes  Are You a Spiritual person: yes  Living will: no    Wt Readings from Last 3 Encounters:   02/22/23 197 lb 3.2 oz (89.4 kg)   01/17/23 202 lb (91.6 kg)   12/13/22 200 lb 3.2 oz (90.8 kg)     BP Readings from Last 3 Encounters:   02/22/23 131/88   01/17/23 (!) 144/97   12/13/22 (!) 147/97       There is no problem list on file for this patient.       Health Maintenance   Topic Date Due    HIV screen  Never done    Hepatitis C screen  Never done    DTaP/Tdap/Td vaccine (1 - Tdap) Never done    Diabetes screen  Never done    Lipids  Never done    COVID-19 Vaccine (3 - Booster for Pfizer series) 06/25/2021    Colorectal Cancer Screen  Never done    Depression Screen  01/17/2024    Flu vaccine  Completed    Hepatitis A vaccine  Aged Out    Hib vaccine  Aged Out    Meningococcal (ACWY) vaccine  Aged Out    Pneumococcal 0-64 years Vaccine  Aged [employer] History   Administered Date(s) Administered    COVID-19, PFIZER PURPLE top, DILUTE for use, (age 15 y+), 30mcg/0.3mL 04/07/2021, 04/30/2021       No Known Allergies  Outpatient Medications Marked as Taking for the 2/22/23 encounter (Office Visit) with Mary Wilson, DO   Medication Sig Dispense Refill    fluticasone (FLONASE) 50 MCG/ACT nasal spray 1 spray by Each Nostril route daily 16 g 0    hydroCHLOROthiazide (MICROZIDE) 12.5 MG capsule Take 1 capsule by mouth every morning 30 capsule 0    Blood Pressure KIT 1 kit by Does not apply route 2 times daily 1 kit 0       Past Medical History:   Diagnosis Date    Atrial fibrillation (HCC)     Chronic back pain     Depression     Hyperthyroidism     Lung injury     occurred with surgery    Osteoarthritis      Past Surgical History:   Procedure Laterality Date    EXPLORATION OF UNDESCENDED TESTICLE       Family History   Problem Relation Age of Onset    Cancer Paternal Aunt      Social History     Socioeconomic History    Marital status: Single     Spouse name: Not on file    Number of children: Not on file    Years of education: Not on file    Highest education level: Not on file   Occupational History    Not on file   Tobacco Use    Smoking status: Former     Packs/day: 0.25     Years: 5.00     Pack years: 1.25     Types: Cigarettes     Quit date: 2020     Years since quittin.5    Smokeless tobacco: Never   Vaping Use    Vaping Use: Never used   Substance and Sexual Activity    Alcohol use: Yes     Comment: 3 days a week    Drug use: Never    Sexual activity: Yes     Partners: Female   Other Topics Concern    Not on file   Social History Narrative    Not on file     Social Determinants of Health     Financial Resource Strain: High Risk    Difficulty of Paying Living Expenses: Very hard   Food Insecurity: Food Insecurity Present    Worried About Running Out of Food in the Last Year: Often true    Ran Out of Food in the Last Year: Never true   Transportation Needs: Unknown    Lack of Transportation (Medical): Not on file    Lack of Transportation (Non-Medical):  No   Physical Activity: Not on file   Stress: Not on file   Social Connections: Not on file   Intimate Partner Violence: Not on file   Housing Stability: Unknown    Unable to Pay for Housing in the Last Year: Not on file    Number of Places Lived in the Last Year: Not on file    Unstable Housing in the Last Year: No       Review ofSystems:  Review of Systems   Constitutional:  Negative for fatigue. HENT:  Negative for congestion. Eyes:  Positive for visual disturbance. Respiratory:  Negative for shortness of breath. Cardiovascular:  Negative for chest pain. Gastrointestinal:  Negative for abdominal pain. Endocrine: Positive for heat intolerance. Genitourinary:  Negative for difficulty urinating. Musculoskeletal:  Positive for back pain. Negative for neck pain. Skin:  Negative for color change. Raised bump below right eye for the last 2 years, has grown in size. Allergic/Immunologic: Negative for environmental allergies. Neurological:  Negative for dizziness and headaches. Hematological:  Does not bruise/bleed easily. Psychiatric/Behavioral:  Positive for dysphoric mood. PhysicalExam:   Vitals:    02/22/23 1532   BP: 131/88   Site: Left Upper Arm   Position: Sitting   Cuff Size: Large Adult   Pulse: 73   Resp: 16   Temp: 97.1 °F (36.2 °C)   TempSrc: Temporal   SpO2: 98%   Weight: 197 lb 3.2 oz (89.4 kg)   Height: 5' 9.8\" (1.773 m)     Body mass index is 28.46 kg/m². Physical Exam  Constitutional:       Appearance: Normal appearance. He is well-developed. HENT:      Head: Normocephalic and atraumatic. Right Ear: Tympanic membrane and external ear normal.      Left Ear: Tympanic membrane and external ear normal.      Nose: Nose normal.      Mouth/Throat:      Mouth: Mucous membranes are moist.   Eyes:      Conjunctiva/sclera: Conjunctivae normal.      Pupils: Pupils are equal, round, and reactive to light. Neck:      Vascular: No carotid bruit. Cardiovascular:      Rate and Rhythm: Normal rate and regular rhythm. Pulses: Normal pulses. Heart sounds: Normal heart sounds. No murmur heard. Pulmonary:      Effort: Pulmonary effort is normal.      Breath sounds: Normal breath sounds. No wheezing.    Abdominal: General: Bowel sounds are normal.      Palpations: Abdomen is soft. Tenderness: There is no abdominal tenderness. Musculoskeletal:         General: Normal range of motion. Cervical back: Normal range of motion. No rigidity or tenderness. Comments: Bilateral UE/LE normal ROM   Lymphadenopathy:      Cervical: No cervical adenopathy. Skin:     General: Skin is warm and dry. Comments: 0.75 cm form/movable subcutaneous mass in upper right cheek below right eye. Neurological:      Mental Status: He is alert and oriented to person, place, and time. Cranial Nerves: No cranial nerve deficit. Sensory: No sensory deficit. Motor: No weakness. Coordination: Coordination normal.      Gait: Gait normal.      Deep Tendon Reflexes: Reflexes are normal and symmetric. Reflexes normal.   Psychiatric:         Mood and Affect: Mood normal.         Behavior: Behavior normal.         Thought Content: Thought content normal.         Judgment: Judgment normal.       Assessment/Plan:   Diagnosis Orders   1. Annual physical exam        2. Primary hypertension        3. Colon cancer screening  POCT FECAL IMMUNOCHEMICAL TEST (FIT)      4. Vision disturbance  External Referral To Ophthalmology      5. Chronic midline thoracic back pain  [address]      6. Chronic bilateral low back pain with bilateral sciatica  [facility] [address]      7. Subcutaneous mass  US SOFT TISSUE LIMITED AREA        Return in about 6 months (around 8/22/2023) for HTN F/U. Prior to Visit Medications    Medication Sig Taking?  Authorizing Provider   fluticasone (FLONASE) 50 MCG/ACT nasal spray 1 spray by Each Nostril route daily Yes Regino Hassan MD   hydroCHLOROthiazide (MICROZIDE) 12.5 MG capsule Take 1 capsule by mouth every morning Yes Regino Hassan MD   Blood Pressure KIT 1 kit by Does not apply route 2 times daily Yes Kelley Corona, DO

## 2023-02-28 ENCOUNTER — TELEPHONE (OUTPATIENT)
Dept: FAMILY MEDICINE CLINIC | Age: 48
End: 2023-02-28

## 2023-02-28 DIAGNOSIS — I10 PRIMARY HYPERTENSION: ICD-10-CM

## 2023-02-28 NOTE — TELEPHONE ENCOUNTER
Patient  Danielle Kelsey said  was going to give him a colon test to take home but it was not given to him.    2/22/2023  last appt    Also pt.  Asking for a refill of    hydroCHLOROthiazide (MICROZIDE) 12.5 MG capsule      Future Appointments   Date Time Provider Enoc Alvarez   3/2/2023  3:30 PM Trinity Camejo [facility] [place]   8/22/2023  3:45 PM Aimee Nolan DO [address] [place] [address] [address] [place] [address] [phone]

## 2023-03-01 RX ORDER — HYDROCHLOROTHIAZIDE 12.5 MG/1
12.5 CAPSULE, GELATIN COATED ORAL EVERY MORNING
Qty: 30 CAPSULE | Refills: 2 | Status: CANCELLED | OUTPATIENT
Start: 2023-03-01

## 2023-03-01 RX ORDER — HYDROCHLOROTHIAZIDE 12.5 MG/1
CAPSULE, GELATIN COATED ORAL
Qty: 90 CAPSULE | Refills: 1 | Status: SHIPPED | OUTPATIENT
Start: 2023-03-01

## 2023-03-01 NOTE — TELEPHONE ENCOUNTER
Attempted to call patient phone would not connect. Mailed fit test to his home.      LOV 2/22/2023  Future Appointments   Date Time Provider Enoc Alvarez   3/2/2023  3:30 PM Daryl Tinoco [facility] [place]   8/22/2023  3:45 PM [facility]

## 2023-03-08 ENCOUNTER — TELEPHONE (OUTPATIENT)
Dept: FAMILY MEDICINE CLINIC | Age: 48
End: 2023-03-08

## 2023-03-08 DIAGNOSIS — Z12.11 COLON CANCER SCREENING: ICD-10-CM

## 2023-03-08 LAB
CONTROL: NORMAL
HEMOCCULT STL QL: NORMAL

## 2023-03-08 PROCEDURE — 82274 ASSAY TEST FOR BLOOD FECAL: CPT | Performed by: FAMILY MEDICINE

## 2023-05-10 ENCOUNTER — TELEPHONE (OUTPATIENT)
Dept: FAMILY MEDICINE CLINIC | Age: 48
End: 2023-05-10

## 2023-05-10 NOTE — TELEPHONE ENCOUNTER
Patient was given order for an [address] on 2/22/23. US was scheduled for 2/27/23. Pt no showed. I called pt and he states there was no location on the referral and he does not remember making that [address] [facility] appointment. I advised there would not be a location on the referral, as they would tell him which location to go to when scheduling. I gave pt the [phone] to call and schedule.

## 2023-05-10 NOTE — TELEPHONE ENCOUNTER
Patient had appointment with Noemi Taylor on 2-22-23 and was given a referral with out any contact information for the subcutaneous mass. Please place a referral and call patient when placed.

## 2023-05-16 ENCOUNTER — HOSPITAL ENCOUNTER (OUTPATIENT)
Dept: ULTRASOUND IMAGING | Age: 48
Discharge: HOME | End: 2023-05-16
Payer: COMMERCIAL

## 2023-05-16 DIAGNOSIS — R22.9 SUBCUTANEOUS MASS: ICD-10-CM

## 2023-05-16 PROCEDURE — 76999 ECHO EXAMINATION PROCEDURE: CPT

## 2023-05-22 ENCOUNTER — TELEPHONE (OUTPATIENT)
Dept: FAMILY MEDICINE CLINIC | Age: 48
End: 2023-05-22

## 2023-05-23 ENCOUNTER — TELEPHONE (OUTPATIENT)
Dept: FAMILY MEDICINE CLINIC | Age: 48
End: 2023-05-23

## 2023-05-23 DIAGNOSIS — R22.9 SUBCUTANEOUS MASS: Primary | ICD-10-CM

## 2023-05-23 NOTE — TELEPHONE ENCOUNTER
Please call pt and let them know that the ultrasound was read as nonspecific but may represent a type of cyst. I will place a referral for surgery, Dr Sari Livingston, for follow-up. Thank you.

## 2023-06-02 ENCOUNTER — INITIAL CONSULT (OUTPATIENT)
Dept: SURGERY | Age: 48
End: 2023-06-02
Payer: COMMERCIAL

## 2023-06-02 VITALS
DIASTOLIC BLOOD PRESSURE: 87 MMHG | WEIGHT: 201.4 LBS | HEART RATE: 84 BPM | BODY MASS INDEX: 28.83 KG/M2 | HEIGHT: 70 IN | SYSTOLIC BLOOD PRESSURE: 136 MMHG

## 2023-06-02 DIAGNOSIS — R22.0 SUBCUTANEOUS MASS OF HEAD: Primary | ICD-10-CM

## 2023-06-02 PROCEDURE — 99202 OFFICE O/P NEW SF 15 MIN: CPT | Performed by: SURGERY

## 2023-06-02 RX ORDER — SODIUM CHLORIDE 0.9 % (FLUSH) 0.9 %
5-40 SYRINGE (ML) INJECTION EVERY 12 HOURS SCHEDULED
OUTPATIENT
Start: 2023-06-02

## 2023-06-02 RX ORDER — SODIUM CHLORIDE 0.9 % (FLUSH) 0.9 %
5-40 SYRINGE (ML) INJECTION PRN
OUTPATIENT
Start: 2023-06-02

## 2023-06-02 RX ORDER — SODIUM CHLORIDE 9 MG/ML
INJECTION, SOLUTION INTRAVENOUS PRN
OUTPATIENT
Start: 2023-06-02

## 2023-06-03 NOTE — PROGRESS NOTES
New Patient [address] Surgery Maybelle Meigs T. [address] [facility], [address] [facility], [address] [facility], [address] [facility]  Phone: [phone]  Fax: [phone] [facility]   YOB: 1975    Date of Visit:  2023    Sheri Keith DO    HPI:    Patient complains of a mass of the face. The mass has been present for 4 years. The mass has changed in a few years. Symptoms associated  are: increasing diameter, unsightliness, pain. Patient denies drainage, infection.     No Known Allergies  Outpatient Medications Marked as Taking for the 23 encounter (Initial consult) with Mayelin Arroyo MD   Medication Sig Dispense Refill    Blood Pressure KIT 1 kit by Does not apply route 2 times daily 1 kit 0       Past Medical History:   Diagnosis Date    Atrial fibrillation (HCC)     Chronic back pain     Depression     Hyperthyroidism     Lung injury     occurred with surgery    Osteoarthritis      Past Surgical History:   Procedure Laterality Date    EXPLORATION OF UNDESCENDED TESTICLE       Family History   Problem Relation Age of Onset    Cancer Paternal Aunt      Social History     Socioeconomic History    Marital status: Single     Spouse name: Not on file    Number of children: Not on file    Years of education: Not on file    Highest education level: Not on file   Occupational History    Not on file   Tobacco Use    Smoking status: Former     Packs/day: 0.25     Years: 5.00     Pack years: 1.25     Types: Cigarettes     Quit date: 2020     Years since quittin.7    Smokeless tobacco: Never   Vaping Use    Vaping Use: Never used   Substance and Sexual Activity    Alcohol use: Yes     Comment: 3 days a week    Drug use: Never    Sexual activity: Yes     Partners: Female   Other Topics Concern    Not on file   Social History Narrative    Not on file     Social Determinants of Health     Financial Resource Strain: High Risk    Difficulty of Paying

## 2023-06-03 NOTE — PATIENT INSTRUCTIONS
[address] [address]  Phone: [phone]  Fax: [phone] will be scheduled for surgery with Dr. Siomara Marshall. The office will call you with the date and time that surgery is scheduled. Please take note of these instructions for surgery: You should have nothing by mouth after midnight the night before your surgery - this includes no food or water. Your surgery will be cancelled if you have taken anything by mouth after midnight, NO exceptions. You will need to have a history and physical prior to your surgery. This will need to be completed up to 30 days before your surgery. This H/P can be completed by your family doctor or the hospital.   IF you take coumadin (warfarin), please stop taking this medication 5 days prior to your surgery. IF you take plavix, please stop taking this 7 days prior to your surgery. Please contact our office if you have a pacemaker or defibrillator. IF you are allergic to latex, please tell our office prior to your surgery. This is important in know before scheduling your surgery. IF you are having an out patient surgery, you will need someone available to drive you home after your surgery, and to also stay with you for the rest of the day. IF you are having a surgery requiring an inpatient stay in the hospital, you will need someone to drive you home upon discharge from the hospital.  Please contact Dr. Sundeep Denson assistant Dave Mims if you have any questions or concerns. Please call the office with any changes in your symptoms or further questions/concerns.  [phone]

## 2023-06-05 NOTE — PROGRESS NOTES
TOBI, RN, SPOKE TO PATIENT TODAY FOR PAT. PATIENT STATED HE PLANNED TO UBER TO [facility] AND HOME POST SURGERY. THIS NURSE ADVISED HE MUST HAVE SOMEONE TO STAY WITH HIM AFTER SURGERY. PATIENT STATED HE WILL WORK ON THIS AND CALL OUR OFFICE WHEN HE ARRANGES HIS POST CARE. THIS NURSE ADVISED SURGERY COULD BE CX IF HE DOES NOT HAVE ARRANGEMENTS MADE FOR POST SURGERY. PATIENT ACKNOWLEDGED UNDERSTANDING. PATIENT STATED HE WAS NOT TOLD AT HIS APPOINTMENT HE NEEDED POST CARE.

## 2023-06-06 ENCOUNTER — TELEPHONE (OUTPATIENT)
Dept: SURGERY | Age: 48
End: 2023-06-06

## 2023-06-06 NOTE — TELEPHONE ENCOUNTER
Pt saw Dr Sara Price in the office 6/2/23 and was given surgery instructions for a Right Facial Cyst Excision (General Anes) scheduled 6/8/23 @ 7:30am arrival 6am [facility] - NPO after midnight deedee b/f surgery - Pt will need  day of surgery - Dr Corbin Iverson to complete pre-op physical - Pt understood and agreed w/ above noted

## 2023-06-07 ENCOUNTER — ANESTHESIA EVENT (OUTPATIENT)
Dept: OPERATING ROOM | Age: 48
End: 2023-06-07
Payer: COMMERCIAL

## 2023-06-07 ENCOUNTER — OFFICE VISIT (OUTPATIENT)
Dept: FAMILY MEDICINE CLINIC | Age: 48
End: 2023-06-07
Payer: COMMERCIAL

## 2023-06-07 VITALS
HEIGHT: 70 IN | OXYGEN SATURATION: 97 % | HEART RATE: 94 BPM | BODY MASS INDEX: 28.77 KG/M2 | DIASTOLIC BLOOD PRESSURE: 76 MMHG | TEMPERATURE: 97.3 F | WEIGHT: 201 LBS | SYSTOLIC BLOOD PRESSURE: 126 MMHG | RESPIRATION RATE: 14 BRPM

## 2023-06-07 DIAGNOSIS — Z01.818 PREOPERATIVE EXAMINATION: ICD-10-CM

## 2023-06-07 DIAGNOSIS — R22.0 SUBCUTANEOUS MASS OF HEAD: ICD-10-CM

## 2023-06-07 DIAGNOSIS — Z01.818 PREOPERATIVE EXAMINATION: Primary | ICD-10-CM

## 2023-06-07 PROBLEM — S69.92XA INJURY OF LEFT WRIST: Status: ACTIVE | Noted: 2021-06-11

## 2023-06-07 PROBLEM — I48.91 NEW ONSET ATRIAL FIBRILLATION (HCC): Status: ACTIVE | Noted: 2017-04-29

## 2023-06-07 PROBLEM — R00.2 PALPITATIONS: Status: ACTIVE | Noted: 2017-04-28

## 2023-06-07 PROCEDURE — 93000 ELECTROCARDIOGRAM COMPLETE: CPT | Performed by: STUDENT IN AN ORGANIZED HEALTH CARE EDUCATION/TRAINING PROGRAM

## 2023-06-07 PROCEDURE — 99213 OFFICE O/P EST LOW 20 MIN: CPT | Performed by: STUDENT IN AN ORGANIZED HEALTH CARE EDUCATION/TRAINING PROGRAM

## 2023-06-07 RX ORDER — SUMATRIPTAN 25 MG/1
25 TABLET, FILM COATED ORAL
COMMUNITY
End: 2023-06-07

## 2023-06-07 RX ORDER — ACETAMINOPHEN 500 MG
500 TABLET ORAL EVERY 6 HOURS PRN
COMMUNITY

## 2023-06-07 SDOH — ECONOMIC STABILITY: FOOD INSECURITY: WITHIN THE PAST 12 MONTHS, THE FOOD YOU BOUGHT JUST DIDN'T LAST AND YOU DIDN'T HAVE MONEY TO GET MORE.: NEVER TRUE

## 2023-06-07 SDOH — ECONOMIC STABILITY: FOOD INSECURITY: WITHIN THE PAST 12 MONTHS, YOU WORRIED THAT YOUR FOOD WOULD RUN OUT BEFORE YOU GOT MONEY TO BUY MORE.: NEVER TRUE

## 2023-06-07 SDOH — ECONOMIC STABILITY: INCOME INSECURITY: HOW HARD IS IT FOR YOU TO PAY FOR THE VERY BASICS LIKE FOOD, HOUSING, MEDICAL CARE, AND HEATING?: VERY HARD

## 2023-06-07 ASSESSMENT — PATIENT HEALTH QUESTIONNAIRE - PHQ9
9. THOUGHTS THAT YOU WOULD BE BETTER OFF DEAD, OR OF HURTING YOURSELF: 0
7. TROUBLE CONCENTRATING ON THINGS, SUCH AS READING THE NEWSPAPER OR WATCHING TELEVISION: 0
6. FEELING BAD ABOUT YOURSELF - OR THAT YOU ARE A FAILURE OR HAVE LET YOURSELF OR YOUR FAMILY DOWN: 2
SUM OF ALL RESPONSES TO PHQ QUESTIONS 1-9: 11
SUM OF ALL RESPONSES TO PHQ QUESTIONS 1-9: 11
10. IF YOU CHECKED OFF ANY PROBLEMS, HOW DIFFICULT HAVE THESE PROBLEMS MADE IT FOR YOU TO DO YOUR WORK, TAKE CARE OF THINGS AT HOME, OR GET ALONG WITH OTHER PEOPLE: 0
8. MOVING OR SPEAKING SO SLOWLY THAT OTHER PEOPLE COULD HAVE NOTICED. OR THE OPPOSITE, BEING SO FIGETY OR RESTLESS THAT YOU HAVE BEEN MOVING AROUND A LOT MORE THAN USUAL: 0
SUM OF ALL RESPONSES TO PHQ QUESTIONS 1-9: 11
2. FEELING DOWN, DEPRESSED OR HOPELESS: 3
4. FEELING TIRED OR HAVING LITTLE ENERGY: 0
3. TROUBLE FALLING OR STAYING ASLEEP: 3
5. POOR APPETITE OR OVEREATING: 0
SUM OF ALL RESPONSES TO PHQ QUESTIONS 1-9: 11
SUM OF ALL RESPONSES TO PHQ9 QUESTIONS 1 & 2: 6
1. LITTLE INTEREST OR PLEASURE IN DOING THINGS: 3

## 2023-06-07 ASSESSMENT — ANXIETY QUESTIONNAIRES
1. FEELING NERVOUS, ANXIOUS, OR ON EDGE: 0
4. TROUBLE RELAXING: 0
IF YOU CHECKED OFF ANY PROBLEMS ON THIS QUESTIONNAIRE, HOW DIFFICULT HAVE THESE PROBLEMS MADE IT FOR YOU TO DO YOUR WORK, TAKE CARE OF THINGS AT HOME, OR GET ALONG WITH OTHER PEOPLE: NOT DIFFICULT AT ALL
2. NOT BEING ABLE TO STOP OR CONTROL WORRYING: 0
7. FEELING AFRAID AS IF SOMETHING AWFUL MIGHT HAPPEN: 0
3. WORRYING TOO MUCH ABOUT DIFFERENT THINGS: 0
6. BECOMING EASILY ANNOYED OR IRRITABLE: 0
GAD7 TOTAL SCORE: 0
5. BEING SO RESTLESS THAT IT IS HARD TO SIT STILL: 0

## 2023-06-07 NOTE — PROGRESS NOTES
Preoperative Consultation- Tova Waters8  YOB: 1975    Date of Service:  6/7/2023      Chief Complaint   Patient presents with    Pre-op Exam     Cyst Removal facial on 06/08/2023         This patient presents to the office today for a preoperative evaluation for the following   Procedure: RIGHT FACIAL CYST EXCISION  Surgeon:Marino Marino MD  Date Of Surgery:   6/8/2023  Location: [facility]  Diagnosis:Subcutaneous mass of head  Mass present for 3- 4 years, located on face. Endorses increase in size and pain. Planned anesthesia: General   Known anesthesia problems: None   Bleeding risk: No recent or remote history of abnormal bleeding  Personal or FH of DVT/PE: No    Patient objection to receiving blood products: No  Patient is a 56 y.o. male  has a past medical history of pAF that resolved   Paroxysmal atrial fibrillation - One episode 2017. History of cardiac monitor with no recurrent episodes of atrial fibrillation. No recurrent episodes. Good functional status. Pt is able to walk four blocks or climb two flights of stairs. Denies recent illnesses or steroid use in past 6 months. Former smoker , quit 2020. I have reviewed the allergies, medications, medical history, and family medical history below.      Allergies   Allergen Reactions    Hydrocodone Nausea Only     Nausea and worsened pain      Outpatient Medications Marked as Taking for the 6/7/23 encounter (Office Visit) with Alan Hunt MD   Medication Sig Dispense Refill    acetaminophen (TYLENOL) 500 MG tablet Take 1 tablet by mouth every 6 hours as needed for Pain       Past Medical History:   Diagnosis Date    Atrial fibrillation ([address]     Chronic back pain     Depression     Hypertension     Hyperthyroidism     Lung injury     occurred with surgery    Osteoarthritis     PONV (postoperative nausea and vomiting)     Wears glasses     reading glasses     Past Surgical

## 2023-06-07 NOTE — PATIENT INSTRUCTIONS
.    PRE OP INSTRUCTION SHEET  - Do not eat or drink anything after 12 midnight  prior to surgery. This includes no water, chewing gum or mints. - Aspirin, Ibuprofen, Advil, Naproxen, Vitamin E, fish oil and other Anti-inflammatory products should be stopped for 5 days before surgery or as directed by your physician. -  Do not smoke, and do not drink any alcoholic beverages 24 hours prior to surgery.    -You may brush your teeth and gargle the morning of surgery.       Complete labs - BMP   Complete annual labs fastig

## 2023-06-07 NOTE — PROGRESS NOTES
TOBI, RN, SPOKE TO PT 6/7/23 AT 12:44 PM.  Patient states has made arrangements for transportation and care post surgery. Pt reports spouse, Valeria Melara, will be bringing pt and staying with pt post surgery 6/8/23. Patient previously stated he was going to come and leave by Carlin Wilson, without care arrangements post surgery. This is now resolved per pt.

## 2023-06-08 ENCOUNTER — ANESTHESIA (OUTPATIENT)
Dept: OPERATING ROOM | Age: 48
End: 2023-06-08
Payer: COMMERCIAL

## 2023-06-08 ENCOUNTER — HOSPITAL ENCOUNTER (OUTPATIENT)
Age: 48
Setting detail: OUTPATIENT SURGERY
Discharge: HOME | End: 2023-06-08
Attending: SURGERY | Admitting: SURGERY
Payer: COMMERCIAL

## 2023-06-08 VITALS
HEIGHT: 70 IN | DIASTOLIC BLOOD PRESSURE: 87 MMHG | HEART RATE: 61 BPM | SYSTOLIC BLOOD PRESSURE: 150 MMHG | TEMPERATURE: 97.7 F | RESPIRATION RATE: 16 BRPM | WEIGHT: 202 LBS | BODY MASS INDEX: 28.92 KG/M2 | OXYGEN SATURATION: 95 %

## 2023-06-08 DIAGNOSIS — L72.3 SEBACEOUS CYST: ICD-10-CM

## 2023-06-08 LAB
ANION GAP SERPL CALCULATED.3IONS-SCNC: 11 MMOL/L (ref 3–16)
ANION GAP SERPL CALCULATED.3IONS-SCNC: 12 MMOL/L (ref 3–16)
BUN SERPL-MCNC: 12 MG/DL (ref 7–20)
BUN SERPL-MCNC: 12 MG/DL (ref 7–20)
CALCIUM SERPL-MCNC: 9.1 MG/DL (ref 8.3–10.6)
CALCIUM SERPL-MCNC: 9.2 MG/DL (ref 8.3–10.6)
CHLORIDE SERPL-SCNC: 105 MMOL/L (ref 99–110)
CHLORIDE SERPL-SCNC: 106 MMOL/L (ref 99–110)
CO2 SERPL-SCNC: 23 MMOL/L (ref 21–32)
CO2 SERPL-SCNC: 26 MMOL/L (ref 21–32)
CREAT SERPL-MCNC: 1 MG/DL (ref 0.9–1.3)
CREAT SERPL-MCNC: 1 MG/DL (ref 0.9–1.3)
GFR SERPLBLD CREATININE-BSD FMLA CKD-EPI: >60 ML/MIN/{1.73_M2}
GFR SERPLBLD CREATININE-BSD FMLA CKD-EPI: >60 ML/MIN/{1.73_M2}
GLUCOSE SERPL-MCNC: 105 MG/DL (ref 70–99)
GLUCOSE SERPL-MCNC: 113 MG/DL (ref 70–99)
POTASSIUM SERPL-SCNC: 4 MMOL/L (ref 3.5–5.1)
POTASSIUM SERPL-SCNC: 4 MMOL/L (ref 3.5–5.1)
SODIUM SERPL-SCNC: 140 MMOL/L (ref 136–145)
SODIUM SERPL-SCNC: 143 MMOL/L (ref 136–145)

## 2023-06-08 PROCEDURE — 6360000002 HC RX W HCPCS: Performed by: ANESTHESIOLOGY

## 2023-06-08 PROCEDURE — 6360000002 HC RX W HCPCS: Performed by: SURGERY

## 2023-06-08 PROCEDURE — 2709999900 HC NON-CHARGEABLE SUPPLY: Performed by: SURGERY

## 2023-06-08 PROCEDURE — 7100000001 HC PACU RECOVERY - ADDTL 15 MIN: Performed by: SURGERY

## 2023-06-08 PROCEDURE — 3700000000 HC ANESTHESIA ATTENDED CARE: Performed by: SURGERY

## 2023-06-08 PROCEDURE — 3600000002 HC SURGERY LEVEL 2 BASE: Performed by: SURGERY

## 2023-06-08 PROCEDURE — 2500000003 HC RX 250 WO HCPCS: Performed by: ANESTHESIOLOGY

## 2023-06-08 PROCEDURE — 2580000003 HC RX 258: Performed by: ANESTHESIOLOGY

## 2023-06-08 PROCEDURE — 88305 TISSUE EXAM BY PATHOLOGIST: CPT

## 2023-06-08 PROCEDURE — 2500000003 HC RX 250 WO HCPCS: Performed by: SURGERY

## 2023-06-08 PROCEDURE — 7100000011 HC PHASE II RECOVERY - ADDTL 15 MIN: Performed by: SURGERY

## 2023-06-08 PROCEDURE — 7100000000 HC PACU RECOVERY - FIRST 15 MIN: Performed by: SURGERY

## 2023-06-08 PROCEDURE — 3600000012 HC SURGERY LEVEL 2 ADDTL 15MIN: Performed by: SURGERY

## 2023-06-08 PROCEDURE — 80048 BASIC METABOLIC PNL TOTAL CA: CPT

## 2023-06-08 PROCEDURE — 3700000001 HC ADD 15 MINUTES (ANESTHESIA): Performed by: SURGERY

## 2023-06-08 PROCEDURE — 7100000010 HC PHASE II RECOVERY - FIRST 15 MIN: Performed by: SURGERY

## 2023-06-08 RX ORDER — LIDOCAINE HYDROCHLORIDE 20 MG/ML
INJECTION, SOLUTION EPIDURAL; INFILTRATION; INTRACAUDAL; PERINEURAL PRN
Status: DISCONTINUED | OUTPATIENT
Start: 2023-06-08 | End: 2023-06-08 | Stop reason: SDUPTHER

## 2023-06-08 RX ORDER — LIDOCAINE HYDROCHLORIDE 10 MG/ML
2 INJECTION, SOLUTION INFILTRATION; PERINEURAL
Status: DISCONTINUED | OUTPATIENT
Start: 2023-06-08 | End: 2023-06-08 | Stop reason: HOSPADM

## 2023-06-08 RX ORDER — MIDAZOLAM HYDROCHLORIDE 1 MG/ML
INJECTION INTRAMUSCULAR; INTRAVENOUS PRN
Status: DISCONTINUED | OUTPATIENT
Start: 2023-06-08 | End: 2023-06-08 | Stop reason: SDUPTHER

## 2023-06-08 RX ORDER — SODIUM CHLORIDE 0.9 % (FLUSH) 0.9 %
5-40 SYRINGE (ML) INJECTION EVERY 12 HOURS SCHEDULED
Status: DISCONTINUED | OUTPATIENT
Start: 2023-06-08 | End: 2023-06-08 | Stop reason: HOSPADM

## 2023-06-08 RX ORDER — SODIUM CHLORIDE 9 MG/ML
INJECTION, SOLUTION INTRAVENOUS PRN
Status: DISCONTINUED | OUTPATIENT
Start: 2023-06-08 | End: 2023-06-08 | Stop reason: HOSPADM

## 2023-06-08 RX ORDER — SODIUM CHLORIDE 0.9 % (FLUSH) 0.9 %
5-40 SYRINGE (ML) INJECTION PRN
Status: DISCONTINUED | OUTPATIENT
Start: 2023-06-08 | End: 2023-06-08 | Stop reason: HOSPADM

## 2023-06-08 RX ORDER — DEXAMETHASONE SODIUM PHOSPHATE 4 MG/ML
INJECTION, SOLUTION INTRA-ARTICULAR; INTRALESIONAL; INTRAMUSCULAR; INTRAVENOUS; SOFT TISSUE PRN
Status: DISCONTINUED | OUTPATIENT
Start: 2023-06-08 | End: 2023-06-08 | Stop reason: SDUPTHER

## 2023-06-08 RX ORDER — PROPOFOL 10 MG/ML
INJECTION, EMULSION INTRAVENOUS PRN
Status: DISCONTINUED | OUTPATIENT
Start: 2023-06-08 | End: 2023-06-08 | Stop reason: SDUPTHER

## 2023-06-08 RX ORDER — ONDANSETRON 2 MG/ML
4 INJECTION INTRAMUSCULAR; INTRAVENOUS
Status: DISCONTINUED | OUTPATIENT
Start: 2023-06-08 | End: 2023-06-08 | Stop reason: HOSPADM

## 2023-06-08 RX ORDER — ONDANSETRON 2 MG/ML
INJECTION INTRAMUSCULAR; INTRAVENOUS PRN
Status: DISCONTINUED | OUTPATIENT
Start: 2023-06-08 | End: 2023-06-08 | Stop reason: SDUPTHER

## 2023-06-08 RX ORDER — FENTANYL CITRATE 50 UG/ML
INJECTION, SOLUTION INTRAMUSCULAR; INTRAVENOUS PRN
Status: DISCONTINUED | OUTPATIENT
Start: 2023-06-08 | End: 2023-06-08 | Stop reason: SDUPTHER

## 2023-06-08 RX ORDER — MEPERIDINE HYDROCHLORIDE 50 MG/ML
12.5 INJECTION INTRAMUSCULAR; INTRAVENOUS; SUBCUTANEOUS EVERY 5 MIN PRN
Status: DISCONTINUED | OUTPATIENT
Start: 2023-06-08 | End: 2023-06-08 | Stop reason: HOSPADM

## 2023-06-08 RX ORDER — CEFAZOLIN SODIUM IN 0.9 % NACL 2 G/100 ML
2000 PLASTIC BAG, INJECTION (ML) INTRAVENOUS
Status: COMPLETED | OUTPATIENT
Start: 2023-06-08 | End: 2023-06-08

## 2023-06-08 RX ORDER — OXYCODONE HYDROCHLORIDE 5 MG/1
5 TABLET ORAL PRN
Status: DISCONTINUED | OUTPATIENT
Start: 2023-06-08 | End: 2023-06-08 | Stop reason: HOSPADM

## 2023-06-08 RX ORDER — DIPHENHYDRAMINE HYDROCHLORIDE 50 MG/ML
12.5 INJECTION INTRAMUSCULAR; INTRAVENOUS
Status: DISCONTINUED | OUTPATIENT
Start: 2023-06-08 | End: 2023-06-08 | Stop reason: HOSPADM

## 2023-06-08 RX ORDER — SODIUM CHLORIDE, SODIUM LACTATE, POTASSIUM CHLORIDE, CALCIUM CHLORIDE 600; 310; 30; 20 MG/100ML; MG/100ML; MG/100ML; MG/100ML
INJECTION, SOLUTION INTRAVENOUS CONTINUOUS
Status: DISCONTINUED | OUTPATIENT
Start: 2023-06-08 | End: 2023-06-08 | Stop reason: HOSPADM

## 2023-06-08 RX ORDER — BUPIVACAINE HYDROCHLORIDE 5 MG/ML
INJECTION, SOLUTION EPIDURAL; INTRACAUDAL PRN
Status: DISCONTINUED | OUTPATIENT
Start: 2023-06-08 | End: 2023-06-08 | Stop reason: ALTCHOICE

## 2023-06-08 RX ORDER — OXYCODONE HYDROCHLORIDE 5 MG/1
10 TABLET ORAL PRN
Status: DISCONTINUED | OUTPATIENT
Start: 2023-06-08 | End: 2023-06-08 | Stop reason: HOSPADM

## 2023-06-08 RX ORDER — LABETALOL HYDROCHLORIDE 5 MG/ML
5 INJECTION, SOLUTION INTRAVENOUS EVERY 10 MIN PRN
Status: DISCONTINUED | OUTPATIENT
Start: 2023-06-08 | End: 2023-06-08 | Stop reason: HOSPADM

## 2023-06-08 RX ORDER — ROCURONIUM BROMIDE 10 MG/ML
INJECTION, SOLUTION INTRAVENOUS PRN
Status: DISCONTINUED | OUTPATIENT
Start: 2023-06-08 | End: 2023-06-08 | Stop reason: SDUPTHER

## 2023-06-08 RX ADMIN — SUGAMMADEX 200 MG: 100 INJECTION, SOLUTION INTRAVENOUS at 08:07

## 2023-06-08 RX ADMIN — ROCURONIUM BROMIDE 40 MG: 10 SOLUTION INTRAVENOUS at 07:36

## 2023-06-08 RX ADMIN — PROPOFOL 200 MG: 10 INJECTION, EMULSION INTRAVENOUS at 07:36

## 2023-06-08 RX ADMIN — MIDAZOLAM 2 MG: 1 INJECTION INTRAMUSCULAR; INTRAVENOUS at 07:27

## 2023-06-08 RX ADMIN — SODIUM CHLORIDE, SODIUM LACTATE, POTASSIUM CHLORIDE, AND CALCIUM CHLORIDE: .6; .31; .03; .02 INJECTION, SOLUTION INTRAVENOUS at 07:26

## 2023-06-08 RX ADMIN — ONDANSETRON 4 MG: 2 INJECTION INTRAMUSCULAR; INTRAVENOUS at 07:44

## 2023-06-08 RX ADMIN — FENTANYL CITRATE 50 MCG: 50 INJECTION, SOLUTION INTRAMUSCULAR; INTRAVENOUS at 07:36

## 2023-06-08 RX ADMIN — Medication 2000 MG: at 07:38

## 2023-06-08 RX ADMIN — FENTANYL CITRATE 50 MCG: 50 INJECTION, SOLUTION INTRAMUSCULAR; INTRAVENOUS at 07:50

## 2023-06-08 RX ADMIN — LIDOCAINE HYDROCHLORIDE 100 MG: 20 INJECTION, SOLUTION EPIDURAL; INFILTRATION; INTRACAUDAL at 07:36

## 2023-06-08 RX ADMIN — DEXAMETHASONE SODIUM PHOSPHATE 4 MG: 4 INJECTION, SOLUTION INTRAMUSCULAR; INTRAVENOUS at 07:43

## 2023-06-08 NOTE — H&P
I have reviewed the history and physical and examined the patient. I find no relevant changes. I have reviewed with the patient and/or family members, during the preoperative office visit the risks, benefits, and alternatives to the procedure.     Gucci Silvestre MD

## 2023-06-08 NOTE — DISCHARGE INSTRUCTIONS
[facility] SURGERY [facility] AND [facility]. Ginette Galvan M.D. [address] [place] [address] [place]                [address] Jamilah Martin M.D. [address] [facility], [address] [place] [address]         [place], [facility] Annabel Croft M.D                         [phone]        [facility] EARL Nichols M.D. [facility]       POST-OPERATIVE INSTRUCTIONS FOLLOWING EXCISION OF A MASS    Call the office to schedule your post-operative appointment with your surgeon for two (2) weeks. You will have surgical glue closing your incisions. You may shower. Wash incisions gently, and pat them dry. Do not rub your incisions. .    Do NOT drive for one week and while taking your narcotic pain medicine. Watch for signs of infection:       Fever over 100.5°     Excessive warmth, or bright redness around your incision    Leakage of bloody or cloudy fluid from you incisions  ANESTHESIA DISCHARGE INSTRUCTIONS    You are under the influence of drugs- do not drink alcohol, drive a car, operate machinery(such as power tools, kitchen appliances, etc), sign legal documents, or make any important decisions for 24 hours (or while on pain medications). Children should not ride bikes or [place] or play on gym sets  for 24 hours after surgery. A responsible adult should be with you for 24 hours. Rest at home today- increase activity as tolerated. Progress slowly to a regular diet unless your physician has instructed you otherwise. Drink plenty of water. CALL YOUR DOCTOR IF YOU:  Have moderate to severe nausea or vomiting AND are unable to hold down fluids or prescribed medications. Have bright red bloody drainage from your dressing that won't stop oozing.   Do not get relief with your pain

## 2023-06-08 NOTE — PROGRESS NOTES
Arrived to [facility] consents verified, NPO since 2000, belongings in locker 3 (lock in place).

## 2023-06-08 NOTE — ANESTHESIA POSTPROCEDURE EVALUATION
Department of Anesthesiology  Postprocedure Note    Patient: Rogelio Michelle  MRN: [id number]  YOB: 1975  Date of evaluation: 6/8/2023      Procedure Summary     Date: 06/08/23 Room / Location: [address] [place] [address]    Anesthesia Start: 0730 Anesthesia Stop: 5462    Procedure: RIGHT FACIAL CYST EXCISION (Right: Face) Diagnosis:       Sebaceous cyst      (Sebaceous cyst)    Surgeons: Su Birch MD Responsible Provider: Yvon Huffman MD    Anesthesia Type: general ASA Status: 2          Anesthesia Type: No value filed.     Dacia Phase I: Dacia Score: 10    Dacia Phase II: Dcaia Score: 10      Anesthesia Post Evaluation    Comments: Postoperative Anesthesia Note    Name:    Rogelio Michelel  MRN:      [id number]    Patient Vitals in the past 12 hrs:  06/08/23 0910, BP:(!) 150/87, Pulse:61, Resp:16, SpO2:95 %  06/08/23 0905, BP:(!) 144/94, Pulse:67, Resp:16, SpO2:95 %  06/08/23 0900, BP:(!) 142/92, Pulse:73, Resp:17, SpO2:93 %  06/08/23 0855, BP:(!) 139/90, Pulse:63, Resp:13, SpO2:94 %  06/08/23 0850, BP:(!) 134/90, Pulse:74, Resp:13, SpO2:95 %  06/08/23 0845, BP:(!) 142/92, Temp:97.7 °F (36.5 °C), Temp src:Temporal, Pulse:67, Resp:12, SpO2:94 %  06/08/23 0840, BP:(!) 143/92, Pulse:76, Resp:15, SpO2:93 %  06/08/23 0835, BP:(!) 139/96, Pulse:73, Resp:14, SpO2:93 %  06/08/23 0830, BP:(!) 143/96, Pulse:79, Resp:12, SpO2:93 %  06/08/23 0825, BP:(!) 141/88, Pulse:82, Resp:17, SpO2:92 %  06/08/23 0820, BP:137/89, Temp:97.1 °F (36.2 °C), Temp src:Temporal, Pulse:86, Resp:13, SpO2:92 %  06/08/23 0558, BP:(!) 142/95, Temp:98.5 °F (36.9 °C), Temp src:Temporal, Pulse:71, Resp:16, Height:5' 10\" (1.778 m), Weight:202 lb (91.6 kg)     LABS:    CBC  No results found for: WBC, HGB, HCT, PLT  RENAL  Lab Results       Component                Value               Date/Time                  NA                       140                 06/08/2023 06:20 AM        K                        4.0

## 2023-06-08 NOTE — OP NOTE
Operative Note      Patient: Naomy Rodriguez  YOB: 1975  MRN: [id number]    Date of Procedure: 6/8/2023    Pre-Op Diagnosis Codes:     * Sebaceous cyst [L72.3]    Post-Op Diagnosis: Same       Procedure(s):  RIGHT FACIAL CYST EXCISION    Surgeon(s):  Danielle Burks MD    Assistant:   Surgical Assistant: Mary Ann Mohan    Anesthesia: General    Estimated Blood Loss (mL): less than 50     Complications: None    Specimens:   ID Type Source Tests Collected by Time Destination   A : RIGHT FACIAL CYST Specimen Face SURGICAL PATHOLOGY Danielle Burks MD 6/8/2023 4455        Implants:  * No implants in log *      Drains: * No LDAs found *    Findings: 1x1.5cm irregularly shaped cyst in the subcutaneous plane just below right eye, fully excised    This procedure was not performed to treat primary cutaneous melanoma through wide local excision      Detailed Description of Procedure:   After informed consent was obtained, the patient was taken to the Operating Room and placed in the supine position. General anesthesia was administered without difficulty. The previously marked cyst on the right cheek just below the eye was prepped and draped in a sterile fashion, with an occlusive barrier placed over the eye to prevent soilage of the eye during the procedure. A 1.5cm horizontal incision was made along the inferior edge of the easily palpable mass. Dissection through the dermis onto the surface of the cyst was accomplished with low energy needletip cautery. The rest of the dissection around the cyst was accomplished with fine tip scissors. The cyst was gradually and readily released from its attachments and removed. The small wound cavity was checked for hemostasis. The incision was then closed with a running 4-0 Monocryl subcuticular suture. Surgical glue was applied to the surface of the incision, and a small amount of local anesthesia infiltrated into the tissues above and below the incision.

## 2023-06-08 NOTE — PROGRESS NOTES
Patient arrived to PACU bay 5, phase one initiated. Placed on bedside monitor, VSS. Report obtained from OR RN and anesthesia. Patient on room air. . Assessment WNL. Warm blankets applied. Side rails in place, will monitor patient closely.

## 2023-06-08 NOTE — ANESTHESIA PRE PROCEDURE
PHART, PO2ART, FYB3DSW, [id number], BEART, [id number]     Type & Screen (If Applicable):  No results found for: LABABO, LABRH    Drug/Infectious Status (If Applicable):  No results found for: HIV, HEPCAB    COVID-19 Screening (If Applicable): No results found for: COVID19        Anesthesia Evaluation  Patient summary reviewed   history of anesthetic complications: PONV. Airway: Mallampati: III  TM distance: >3 FB   Neck ROM: full  Mouth opening: > = 3 FB   Dental:          Pulmonary:normal exam  breath sounds clear to auscultation      (-) COPD, asthma and sleep apnea                           Cardiovascular:  Exercise tolerance: good (>4 METS),   (+) hypertension:, dysrhythmias: atrial fibrillation,     (-) CAD,  angina and  [place]KE      Rhythm: regular  Rate: normal                    Neuro/Psych:   (+) psychiatric history:   (-) seizures and TIA           GI/Hepatic/Renal:        (-) GERD, liver disease and no renal disease       Endo/Other:    (+) hyperthyroidism::., .    (-) diabetes mellitus               Abdominal:             Vascular: Other Findings:           Anesthesia Plan      general     ASA 2     (I discussed with the patient the risks and benefits of PIV, anesthesia, IV Narcotics, PACU. All questions were answered the patient agrees with the plan and wishes to proceed)  Induction: intravenous.                             Carson Bray MD   6/8/2023

## 2023-06-10 DIAGNOSIS — R00.2 HEART PALPITATIONS: ICD-10-CM

## 2023-06-10 LAB
ALBUMIN SERPL-MCNC: 4.3 G/DL (ref 3.4–5)
ALBUMIN/GLOB SERPL: 2 {RATIO} (ref 1.1–2.2)
ALP SERPL-CCNC: 62 U/L (ref 40–129)
ALT SERPL-CCNC: 25 U/L (ref 10–40)
ANION GAP SERPL CALCULATED.3IONS-SCNC: 12 MMOL/L (ref 3–16)
AST SERPL-CCNC: 18 U/L (ref 15–37)
BASOPHILS # BLD: 0.1 K/UL (ref 0–0.2)
BASOPHILS NFR BLD: 1.1 %
BILIRUB SERPL-MCNC: 0.4 MG/DL (ref 0–1)
BUN SERPL-MCNC: 13 MG/DL (ref 7–20)
CALCIUM SERPL-MCNC: 9 MG/DL (ref 8.3–10.6)
CHLORIDE SERPL-SCNC: 109 MMOL/L (ref 99–110)
CHOLEST SERPL-MCNC: 164 MG/DL (ref 0–199)
CO2 SERPL-SCNC: 24 MMOL/L (ref 21–32)
CREAT SERPL-MCNC: 0.9 MG/DL (ref 0.9–1.3)
DEPRECATED RDW RBC AUTO: 14.7 % (ref 12.4–15.4)
EOSINOPHIL # BLD: 0.1 K/UL (ref 0–0.6)
EOSINOPHIL NFR BLD: 2.4 %
GFR SERPLBLD CREATININE-BSD FMLA CKD-EPI: >60 ML/MIN/{1.73_M2}
GLUCOSE SERPL-MCNC: 106 MG/DL (ref 70–99)
HCT VFR BLD AUTO: 41.1 % (ref 40.5–52.5)
HDLC SERPL-MCNC: 58 MG/DL (ref 40–60)
HGB BLD-MCNC: 13.8 G/DL (ref 13.5–17.5)
LDLC SERPL CALC-MCNC: 84 MG/DL
LYMPHOCYTES # BLD: 1.8 K/UL (ref 1–5.1)
LYMPHOCYTES NFR BLD: 36.7 %
MCH RBC QN AUTO: 28.7 PG (ref 26–34)
MCHC RBC AUTO-ENTMCNC: 33.5 G/DL (ref 31–36)
MCV RBC AUTO: 85.7 FL (ref 80–100)
MONOCYTES # BLD: 0.4 K/UL (ref 0–1.3)
MONOCYTES NFR BLD: 8.2 %
NEUTROPHILS # BLD: 2.6 K/UL (ref 1.7–7.7)
NEUTROPHILS NFR BLD: 51.6 %
PLATELET # BLD AUTO: 182 K/UL (ref 135–450)
PMV BLD AUTO: 9.3 FL (ref 5–10.5)
POTASSIUM SERPL-SCNC: 4.4 MMOL/L (ref 3.5–5.1)
PROT SERPL-MCNC: 6.5 G/DL (ref 6.4–8.2)
RBC # BLD AUTO: 4.8 M/UL (ref 4.2–5.9)
SODIUM SERPL-SCNC: 145 MMOL/L (ref 136–145)
TRIGL SERPL-MCNC: 109 MG/DL (ref 0–150)
TSH SERPL DL<=0.005 MIU/L-ACNC: 0.36 UIU/ML (ref 0.27–4.2)
VLDLC SERPL CALC-MCNC: 22 MG/DL
WBC # BLD AUTO: 5 K/UL (ref 4–11)

## 2023-10-11 DIAGNOSIS — I10 PRIMARY HYPERTENSION: ICD-10-CM

## 2023-10-12 RX ORDER — HYDROCHLOROTHIAZIDE 12.5 MG/1
12.5 CAPSULE, GELATIN COATED ORAL EVERY MORNING
Qty: 30 CAPSULE | Refills: 0 | OUTPATIENT
Start: 2023-10-12

## 2023-12-29 DIAGNOSIS — I10 PRIMARY HYPERTENSION: ICD-10-CM

## 2023-12-29 RX ORDER — AMLODIPINE BESYLATE 10 MG/1
TABLET ORAL
Qty: 30 TABLET | Refills: 0 | Status: SHIPPED | OUTPATIENT
Start: 2023-12-29

## 2024-02-01 DIAGNOSIS — I10 PRIMARY HYPERTENSION: ICD-10-CM

## 2024-02-01 RX ORDER — AMLODIPINE BESYLATE 10 MG/1
10 TABLET ORAL DAILY
Qty: 90 TABLET | Refills: 1 | Status: SHIPPED | OUTPATIENT
Start: 2024-02-01

## 2024-06-05 ENCOUNTER — OFFICE VISIT (OUTPATIENT)
Dept: FAMILY MEDICINE CLINIC | Age: 49
End: 2024-06-05
Payer: COMMERCIAL

## 2024-06-05 VITALS
BODY MASS INDEX: 31.14 KG/M2 | HEART RATE: 75 BPM | WEIGHT: 217 LBS | SYSTOLIC BLOOD PRESSURE: 134 MMHG | TEMPERATURE: 97.1 F | RESPIRATION RATE: 16 BRPM | OXYGEN SATURATION: 99 % | DIASTOLIC BLOOD PRESSURE: 88 MMHG

## 2024-06-05 DIAGNOSIS — I10 PRIMARY HYPERTENSION: Primary | ICD-10-CM

## 2024-06-05 DIAGNOSIS — Z13.31 POSITIVE DEPRESSION SCREENING: ICD-10-CM

## 2024-06-05 PROCEDURE — 3079F DIAST BP 80-89 MM HG: CPT | Performed by: FAMILY MEDICINE

## 2024-06-05 PROCEDURE — 3075F SYST BP GE 130 - 139MM HG: CPT | Performed by: FAMILY MEDICINE

## 2024-06-05 PROCEDURE — 99214 OFFICE O/P EST MOD 30 MIN: CPT | Performed by: FAMILY MEDICINE

## 2024-06-05 RX ORDER — LISINOPRIL 20 MG/1
20 TABLET ORAL DAILY
Qty: 90 TABLET | Refills: 1 | Status: SHIPPED | OUTPATIENT
Start: 2024-06-05

## 2024-06-05 RX ORDER — BUPROPION HYDROCHLORIDE 150 MG/1
150 TABLET ORAL EVERY MORNING
Qty: 30 TABLET | Refills: 3 | Status: SHIPPED | OUTPATIENT
Start: 2024-06-05

## 2024-06-05 ASSESSMENT — PATIENT HEALTH QUESTIONNAIRE - PHQ9
10. IF YOU CHECKED OFF ANY PROBLEMS, HOW DIFFICULT HAVE THESE PROBLEMS MADE IT FOR YOU TO DO YOUR WORK, TAKE CARE OF THINGS AT HOME, OR GET ALONG WITH OTHER PEOPLE: VERY DIFFICULT
SUM OF ALL RESPONSES TO PHQ9 QUESTIONS 1 & 2: 6
1. LITTLE INTEREST OR PLEASURE IN DOING THINGS: NEARLY EVERY DAY
8. MOVING OR SPEAKING SO SLOWLY THAT OTHER PEOPLE COULD HAVE NOTICED. OR THE OPPOSITE - BEING SO FIDGETY OR RESTLESS THAT YOU HAVE BEEN MOVING AROUND A LOT MORE THAN USUAL: MORE THAN HALF THE DAYS
SUM OF ALL RESPONSES TO PHQ QUESTIONS 1-9: 22
2. FEELING DOWN, DEPRESSED OR HOPELESS: NEARLY EVERY DAY
2. FEELING DOWN, DEPRESSED OR HOPELESS: NEARLY EVERY DAY
1. LITTLE INTEREST OR PLEASURE IN DOING THINGS: NEARLY EVERY DAY
5. POOR APPETITE OR OVEREATING: MORE THAN HALF THE DAYS
10. IF YOU CHECKED OFF ANY PROBLEMS, HOW DIFFICULT HAVE THESE PROBLEMS MADE IT FOR YOU TO DO YOUR WORK, TAKE CARE OF THINGS AT HOME, OR GET ALONG WITH OTHER PEOPLE: VERY DIFFICULT
4. FEELING TIRED OR HAVING LITTLE ENERGY: NEARLY EVERY DAY
SUM OF ALL RESPONSES TO PHQ QUESTIONS 1-9: 23
6. FEELING BAD ABOUT YOURSELF - OR THAT YOU ARE A FAILURE OR HAVE LET YOURSELF OR YOUR FAMILY DOWN: NEARLY EVERY DAY
SUM OF ALL RESPONSES TO PHQ QUESTIONS 1-9: 23
8. MOVING OR SPEAKING SO SLOWLY THAT OTHER PEOPLE COULD HAVE NOTICED. OR THE OPPOSITE, BEING SO FIGETY OR RESTLESS THAT YOU HAVE BEEN MOVING AROUND A LOT MORE THAN USUAL: MORE THAN HALF THE DAYS
4. FEELING TIRED OR HAVING LITTLE ENERGY: NEARLY EVERY DAY
9. THOUGHTS THAT YOU WOULD BE BETTER OFF DEAD, OR OF HURTING YOURSELF: SEVERAL DAYS
SUM OF ALL RESPONSES TO PHQ QUESTIONS 1-9: 23
6. FEELING BAD ABOUT YOURSELF - OR THAT YOU ARE A FAILURE OR HAVE LET YOURSELF OR YOUR FAMILY DOWN: NEARLY EVERY DAY
3. TROUBLE FALLING OR STAYING ASLEEP: NEARLY EVERY DAY
SUM OF ALL RESPONSES TO PHQ QUESTIONS 1-9: 23
3. TROUBLE FALLING OR STAYING ASLEEP: NEARLY EVERY DAY
9. THOUGHTS THAT YOU WOULD BE BETTER OFF DEAD, OR OF HURTING YOURSELF: SEVERAL DAYS
5. POOR APPETITE OR OVEREATING: MORE THAN HALF THE DAYS
7. TROUBLE CONCENTRATING ON THINGS, SUCH AS READING THE NEWSPAPER OR WATCHING TELEVISION: NEARLY EVERY DAY
7. TROUBLE CONCENTRATING ON THINGS, SUCH AS READING THE NEWSPAPER OR WATCHING TELEVISION: NEARLY EVERY DAY
SUM OF ALL RESPONSES TO PHQ9 QUESTIONS 1 & 2: 6

## 2024-06-05 ASSESSMENT — COLUMBIA-SUICIDE SEVERITY RATING SCALE - C-SSRS
1. IN THE PAST MONTH, HAVE YOU WISHED YOU WERE DEAD OR WISHED YOU COULD GO TO SLEEP AND NOT WAKE UP?: YES
6. IN YOUR LIFETIME, HAVE YOU EVER DONE ANYTHING, STARTED TO DO ANYTHING, OR PREPARED TO DO ANYTHING TO END YOUR LIFE?: NO
2. IN THE PAST MONTH, HAVE YOU ACTUALLY HAD ANY THOUGHTS OF KILLING YOURSELF?: NO

## 2024-06-05 NOTE — PROGRESS NOTES
6/5/2024    This is a 48 y.o. male   Chief Complaint   Patient presents with    OTHER     Involuntary muscle movements on amlodipine?, eye twitching    .    HPI  Pt presents today for the following:    HTN: Taking amlodipine 10 mg daily, today's blood pressure 134/80, home readings high but hasn't had cuff checked    Admits to twitching of his left eye and tingling by his ear since starting Amlodipine    Positive depression screen with a score of 1: Currently not taking prescription anxiety/depression medications, denies a plan or thoughts of hurting othes, has tried Zoloft and Prozac which caused SE's (racing mind/sluggish mind).  States that he has been depressed since losing his job and that his current depression is affecting his marriage.  Past Medical History:   Diagnosis Date    Atrial fibrillation (HCC)     Chronic back pain     Depression     Hypertension     Hyperthyroidism     Lung injury     occurred with surgery    Osteoarthritis     PONV (postoperative nausea and vomiting)     Wears glasses     reading glasses       Orders Only on 06/10/2023   Component Date Value Ref Range Status    Sodium 06/10/2023 145  136 - 145 mmol/L Final    Potassium 06/10/2023 4.4  3.5 - 5.1 mmol/L Final    Chloride 06/10/2023 109  99 - 110 mmol/L Final    CO2 06/10/2023 24  21 - 32 mmol/L Final    Anion Gap 06/10/2023 12  3 - 16 Final    Glucose 06/10/2023 106 (H)  70 - 99 mg/dL Final    BUN 06/10/2023 13  7 - 20 mg/dL Final    Creatinine 06/10/2023 0.9  0.9 - 1.3 mg/dL Final    Est, Glom Filt Rate 06/10/2023 >60  >60 Final    Comment: Pediatric calculator link  https://www.kidney.org/professionals/kdoqi/gfr_calculatorped  Effective Oct 3, 2022  These results are not intended for use in patients  <18 years of age.  eGFR results are calculated without  a race factor using the 2021 CKD-EPI equation.  Careful  clinical correlation is recommended, particularly when  comparing to results calculated using previous equations.  The

## 2024-06-06 ENCOUNTER — NURSE ONLY (OUTPATIENT)
Dept: FAMILY MEDICINE CLINIC | Age: 49
End: 2024-06-06

## 2024-06-06 ENCOUNTER — TELEPHONE (OUTPATIENT)
Dept: FAMILY MEDICINE CLINIC | Age: 49
End: 2024-06-06

## 2024-06-06 VITALS — SYSTOLIC BLOOD PRESSURE: 118 MMHG | DIASTOLIC BLOOD PRESSURE: 90 MMHG

## 2024-06-06 NOTE — TELEPHONE ENCOUNTER
Patient came in today for lab BP check. When checked /90. Patient did bring machine but batteries were dead. Patient had wrist BP cuff. I suggested patient to get a arm cuffed BP machine.

## 2024-06-06 NOTE — TELEPHONE ENCOUNTER
Will patient bring the new arm cuff in for another nurse visit after he purchases it?  If not, please assist him in scheduling another nurse visit to bring the arm cuff to compare to ours.  Thank you

## 2024-06-26 ENCOUNTER — TELEPHONE (OUTPATIENT)
Dept: FAMILY MEDICINE CLINIC | Age: 49
End: 2024-06-26

## 2024-06-26 NOTE — TELEPHONE ENCOUNTER
Spoke to patient informed patient he missed his appointment . Patient thought his appointment was at 3 pm.  Patient will call back to reschedule.

## 2024-09-12 ENCOUNTER — OFFICE VISIT (OUTPATIENT)
Dept: FAMILY MEDICINE CLINIC | Age: 49
End: 2024-09-12
Payer: COMMERCIAL

## 2024-09-12 VITALS
HEART RATE: 60 BPM | WEIGHT: 209.4 LBS | HEIGHT: 70 IN | DIASTOLIC BLOOD PRESSURE: 88 MMHG | SYSTOLIC BLOOD PRESSURE: 134 MMHG | OXYGEN SATURATION: 98 % | BODY MASS INDEX: 29.98 KG/M2

## 2024-09-12 DIAGNOSIS — I10 PRIMARY HYPERTENSION: ICD-10-CM

## 2024-09-12 DIAGNOSIS — M51.36 BULGING OF LUMBAR INTERVERTEBRAL DISC: ICD-10-CM

## 2024-09-12 DIAGNOSIS — M62.830 MUSCLE SPASM OF BACK: Primary | ICD-10-CM

## 2024-09-12 PROCEDURE — 99214 OFFICE O/P EST MOD 30 MIN: CPT | Performed by: SURGERY

## 2024-09-12 PROCEDURE — 3075F SYST BP GE 130 - 139MM HG: CPT | Performed by: SURGERY

## 2024-09-12 PROCEDURE — 3079F DIAST BP 80-89 MM HG: CPT | Performed by: SURGERY

## 2024-09-12 RX ORDER — LOSARTAN POTASSIUM 50 MG/1
50 TABLET ORAL DAILY
Qty: 90 TABLET | Refills: 1 | Status: SHIPPED | OUTPATIENT
Start: 2024-09-12

## 2024-09-12 RX ORDER — METHOCARBAMOL 750 MG/1
750 TABLET, FILM COATED ORAL 4 TIMES DAILY
Qty: 40 TABLET | Refills: 0 | Status: SHIPPED | OUTPATIENT
Start: 2024-09-12 | End: 2024-09-22

## 2024-09-12 RX ORDER — PREDNISONE 20 MG/1
20 TABLET ORAL 2 TIMES DAILY
Qty: 10 TABLET | Refills: 0 | Status: SHIPPED | OUTPATIENT
Start: 2024-09-12 | End: 2024-09-17

## 2024-09-12 SDOH — ECONOMIC STABILITY: FOOD INSECURITY: WITHIN THE PAST 12 MONTHS, YOU WORRIED THAT YOUR FOOD WOULD RUN OUT BEFORE YOU GOT MONEY TO BUY MORE.: NEVER TRUE

## 2024-09-12 SDOH — ECONOMIC STABILITY: FOOD INSECURITY: WITHIN THE PAST 12 MONTHS, THE FOOD YOU BOUGHT JUST DIDN'T LAST AND YOU DIDN'T HAVE MONEY TO GET MORE.: NEVER TRUE

## 2024-09-12 SDOH — ECONOMIC STABILITY: INCOME INSECURITY: HOW HARD IS IT FOR YOU TO PAY FOR THE VERY BASICS LIKE FOOD, HOUSING, MEDICAL CARE, AND HEATING?: NOT HARD AT ALL

## 2024-09-12 ASSESSMENT — ENCOUNTER SYMPTOMS
ABDOMINAL PAIN: 0
BOWEL INCONTINENCE: 0
BACK PAIN: 1

## 2024-10-07 ENCOUNTER — TELEPHONE (OUTPATIENT)
Dept: FAMILY MEDICINE CLINIC | Age: 49
End: 2024-10-07

## 2024-10-07 DIAGNOSIS — Z12.11 COLON CANCER SCREENING: Primary | ICD-10-CM

## 2024-10-07 NOTE — TELEPHONE ENCOUNTER
Patient is calling to request a fit test. Please call and let him know when it is ready for . He said leave a VM if he doesn't answer because he may be at work

## 2024-10-09 ENCOUNTER — NURSE ONLY (OUTPATIENT)
Dept: FAMILY MEDICINE CLINIC | Age: 49
End: 2024-10-09
Payer: COMMERCIAL

## 2024-10-09 DIAGNOSIS — Z12.11 COLON CANCER SCREENING: ICD-10-CM

## 2024-10-09 LAB
CONTROL: NORMAL
FECAL BLOOD IMMUNOCHEMICAL TEST: NORMAL

## 2024-10-09 PROCEDURE — 82274 ASSAY TEST FOR BLOOD FECAL: CPT | Performed by: FAMILY MEDICINE

## 2024-11-12 DIAGNOSIS — Z13.31 POSITIVE DEPRESSION SCREENING: ICD-10-CM

## 2024-11-12 RX ORDER — BUPROPION HYDROCHLORIDE 150 MG/1
150 TABLET ORAL EVERY MORNING
Qty: 90 TABLET | Refills: 0 | Status: SHIPPED | OUTPATIENT
Start: 2024-11-12

## (undated) DEVICE — SUTURE MCRYL + SZ 4-0 L18IN ABSRB UD L19MM PS-2 3/8 CIR MCP496G

## (undated) DEVICE — GOWN,REINF,POLY,AURORA,XLNG/XXL,STRL: Brand: MEDLINE

## (undated) DEVICE — GLOVE,SURG,SENSICARE SLT,LF,PF,7.5: Brand: MEDLINE

## (undated) DEVICE — Device

## (undated) DEVICE — 10FR FRAZIER SUCTION HANDLE: Brand: CARDINAL HEALTH

## (undated) DEVICE — SUTURE MCRYL SZ 4 0 L18IN ABSRB UD P 3 3 8 CIR REV CUT NDL MCP494G

## (undated) DEVICE — ADHESIVE SKIN CLSR 0.7ML TOP DERMBND ADV

## (undated) DEVICE — SUTURE VCRL + SZ 3-0 L18IN ABSRB UD SH 1/2 CIR TAPERCUT NDL VCP864D